# Patient Record
Sex: MALE | Race: WHITE | NOT HISPANIC OR LATINO | Employment: FULL TIME | URBAN - METROPOLITAN AREA
[De-identification: names, ages, dates, MRNs, and addresses within clinical notes are randomized per-mention and may not be internally consistent; named-entity substitution may affect disease eponyms.]

---

## 2018-04-05 ENCOUNTER — OFFICE VISIT (OUTPATIENT)
Dept: URGENT CARE | Facility: CLINIC | Age: 38
End: 2018-04-05
Payer: COMMERCIAL

## 2018-04-05 VITALS
SYSTOLIC BLOOD PRESSURE: 132 MMHG | WEIGHT: 240 LBS | OXYGEN SATURATION: 98 % | TEMPERATURE: 98 F | RESPIRATION RATE: 16 BRPM | DIASTOLIC BLOOD PRESSURE: 86 MMHG | HEIGHT: 74 IN | BODY MASS INDEX: 30.8 KG/M2 | HEART RATE: 62 BPM

## 2018-04-05 DIAGNOSIS — S62.357A CLOSED NONDISPLACED FRACTURE OF SHAFT OF FIFTH METACARPAL BONE OF LEFT HAND, INITIAL ENCOUNTER: Primary | ICD-10-CM

## 2018-04-05 DIAGNOSIS — S69.92XA INJURY OF LEFT HAND, INITIAL ENCOUNTER: ICD-10-CM

## 2018-04-05 PROCEDURE — 99203 OFFICE O/P NEW LOW 30 MIN: CPT | Mod: SA,S$GLB,, | Performed by: NURSE PRACTITIONER

## 2018-04-05 RX ORDER — TRAMADOL HYDROCHLORIDE 50 MG/1
50 TABLET ORAL EVERY 6 HOURS PRN
Qty: 10 TABLET | Refills: 0 | Status: SHIPPED | OUTPATIENT
Start: 2018-04-05 | End: 2018-08-06

## 2018-04-05 RX ORDER — IBUPROFEN 800 MG/1
800 TABLET ORAL 3 TIMES DAILY
Qty: 21 TABLET | Refills: 0 | Status: SHIPPED | OUTPATIENT
Start: 2018-04-05 | End: 2018-04-12

## 2018-04-05 NOTE — PROGRESS NOTES
"Subjective:       Patient ID: Jason Madden is a 37 y.o. male.    Vitals:  height is 6' 2" (1.88 m) and weight is 108.9 kg (240 lb). His oral temperature is 97.7 °F (36.5 °C). His blood pressure is 132/86 and his pulse is 62. His respiration is 16 and oxygen saturation is 98%.     Chief Complaint: Hand Pain (Left)    Pt presents with left hand pain. 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 He has iced it last night and today. He has been taking advil.   NV intact. + 2 radial pulse. + bony tenderness, swelling and bruising.       Hand Pain    The incident occurred 12 to 24 hours ago. The injury mechanism was a direct blow. The pain is present in the left fingers and left hand. The quality of the pain is described as cramping. The pain is at a severity of 5/10. The pain is mild. Pertinent negatives include no chest pain. He has tried NSAIDs for the symptoms.     Review of Systems   Constitution: Negative for chills and fever.   HENT: Negative for sore throat.    Eyes: Negative for blurred vision.   Cardiovascular: Negative for chest pain.   Respiratory: Negative for shortness of breath.    Skin: Negative for rash.   Musculoskeletal: Positive for stiffness. Negative for back pain and joint pain.        A little bit of numbness   Bruising   Swelling    Gastrointestinal: Negative for abdominal pain, diarrhea, nausea and vomiting.   Neurological: Negative for headaches.   Psychiatric/Behavioral: The patient is not nervous/anxious.        Objective:      Physical Exam   Constitutional: He is oriented to person, place, and time. He appears well-developed and well-nourished. No distress.   HENT:   Head: Normocephalic and atraumatic.   Right Ear: External ear normal.   Left Ear: External ear normal.   Nose: Nose normal.   Mouth/Throat: Oropharynx is clear and moist.   Eyes: Conjunctivae and EOM are " normal. Pupils are equal, round, and reactive to light.   Neck: Normal range of motion. Neck supple.   Cardiovascular: Intact distal pulses.    Pulses:       Radial pulses are 2+ on the right side, and 2+ on the left side.   Musculoskeletal:        Left hand: He exhibits decreased range of motion, tenderness, bony tenderness and swelling. He exhibits normal capillary refill, no deformity and no laceration.        Hands:  Neurological: He is alert and oriented to person, place, and time.   Skin: Skin is warm and dry. Capillary refill takes less than 2 seconds. Bruising noted. No abrasion, no laceration, no lesion and no rash noted. He is not diaphoretic. No erythema.   Psychiatric: He has a normal mood and affect. His behavior is normal. Judgment and thought content normal.                     Reading Physician Reading Date Result Priority   Santos Jean-Baptiste MD 4/5/2018    Narrative     EXAMINATION:  XR HAND COMPLETE 3 VIEW LEFT    CLINICAL HISTORY:  . Unspecified injury of left wrist, hand and finger(s), initial encounter    TECHNIQUE:  PA, lateral, and oblique views of the left hand were performed.    COMPARISON:  None    FINDINGS:  Acute nondisplaced fracture of the 5th metacarpal shaft.  Mild adjacent soft tissue thickening.  Remaining osseous structures are intact.  Joint spaces are satisfactorily maintained.  No radiopaque foreign body.      Impression       Acute nondisplaced fracture of the 5th metacarpal shaft.      Electronically signed by: Santos Jean-Baptiste MD  Date: 04/05/2018  Time: 17:15         Assessment:       1. Closed nondisplaced fracture of shaft of fifth metacarpal bone of left hand, initial encounter    2. Injury of left hand, initial encounter        Plan:         Closed nondisplaced fracture of shaft of fifth metacarpal bone of left hand, initial encounter  -     Cancel: Ambulatory referral to Orthopedics  -     SPLINT FOR HOME USE  -     Ambulatory referral to Orthopedics  -     ibuprofen  (ADVIL,MOTRIN) 800 MG tablet; Take 1 tablet (800 mg total) by mouth 3 (three) times daily.  Dispense: 21 tablet; Refill: 0  -     SLING FOR HOME USE  -     traMADol (ULTRAM) 50 mg tablet; Take 1 tablet (50 mg total) by mouth every 6 (six) hours as needed for Pain.  Dispense: 10 tablet; Refill: 0    Injury of left hand, initial encounter  -     X-Ray Hand Complete Left; Future; Expected date: 04/05/2018  -     Ambulatory referral to Orthopedics      Left message for [facility]  line to call patient for ortho referral- hand specialist.   Velcro splint placed in clinic (ulnar gutter splint). NV intact. + 2 pulse. Sling to help with elevation. Discussed splint care.   RICE.   Ultram for severe pain.     Patient Instructions   Take Ibuprofen with food.   Referral placed for ortho follow up. Need to follow up with hand specialist. Call [phone] to schedule an appointment.   Keep splint in place until you are seen by ortho.   Sling to help with elevation.   Rest, ice, elevate.       Please return here or go to the Emergency Department for any concerns or worsening of condition.  If you were prescribed antibiotics, please take them to completion.  If you were prescribed a narcotic medication, do not drive or operate heavy equipment or machinery while taking these medications.  Please follow up with your primary care doctor or specialist as needed.    If you  smoke, please stop smoking.      Closed Hand Fracture (Adult)  You have a fracture, or broken bone, in your hand. This may be a small crack or chip in the bone. Or it may be a major break with the broken parts pushed out of place. A closed fracture means that the broken bone has not gone through the skin. A hand fracture is treated with a splint or cast. It usually takes 4 to 6 weeks to heal. Severe injuries may require surgery.     Home care  · Keep your arm elevated to reduce pain and swelling. When sitting or lying down, elevate your arm above the level of  your heart. You can do this by placing your arm on a pillow that rests on your chest or on a pillow at your side. This is most important during the first 48 hours after injury.  · Apply an ice pack over the injured area for no more than 15 to 20 minutes. Do this every 1 to 2 hours for the first 24 to 48 hours. Continue with ice packs as needed to ease pain and swelling. To make an ice pack, put ice cubes in a plastic bag that seals at the top. Wrap the bag in a clean, thin towel or cloth. Never put ice or an ice pack directly on the skin. You can place the ice pack inside the sling and directly over the cast or splint. As the ice melts, be careful that the cast or splint doesnt get wet.  · Keep the cast or splint completely dry at all times. Bathe with your cast or splint out of the water, protected with 2 large plastic bags. Place 1 bag outside the other. Tape each bag with duct tape at the top end. If a fiberglass cast or splint gets wet, dry it with a hair dryer on a cool setting.  · You may use over-the-counter pain medicine to control pain, unless another pain medicine was prescribed. If you have chronic liver or kidney disease or ever had a stomach ulcer or GI bleeding, talk with your provider beforeusing these medicines.  Follow-up care  Follow up with your healthcare provider within 1 week, or as advised. This is to be sure the bone is healing properly. If you were given a splint, it may be changed to a cast at your follow-up visit.  If X-rays were taken, you will be told of any new findings that may affect your care.  When to seek medical advice  Call your healthcare provider right away if any of these occur:  · The plaster cast or splint becomes wet or soft  · The fiberglass cast or splint stays wet for more than 24 hours  · The cast has a bad smell  · The plaster cast or splint becomes loose  · There is increased tightness or pain under the cast or splint  · The fingers on your injured hand become  swollen, cold, blue, numb, or tingly  Date Last Reviewed: 12/3/2015  © 9600-5052 [employer] [address] [place] [address], [place] [address]. All rights reserved. This information is not intended as a substitute for professional medical care. Always follow your healthcare professional's instructions.

## 2018-04-05 NOTE — PATIENT INSTRUCTIONS
Take Ibuprofen with food.   Referral placed for ortho follow up. Need to follow up with hand specialist. Call [phone] to schedule an appointment.   Keep splint in place until you are seen by ortho.   Sling to help with elevation.   Rest, ice, elevate.       Please return here or go to the Emergency Department for any concerns or worsening of condition.  If you were prescribed antibiotics, please take them to completion.  If you were prescribed a narcotic medication, do not drive or operate heavy equipment or machinery while taking these medications.  Please follow up with your primary care doctor or specialist as needed.    If you  smoke, please stop smoking.      Closed Hand Fracture (Adult)  You have a fracture, or broken bone, in your hand. This may be a small crack or chip in the bone. Or it may be a major break with the broken parts pushed out of place. A closed fracture means that the broken bone has not gone through the skin. A hand fracture is treated with a splint or cast. It usually takes 4 to 6 weeks to heal. Severe injuries may require surgery.     Home care  · Keep your arm elevated to reduce pain and swelling. When sitting or lying down, elevate your arm above the level of your heart. You can do this by placing your arm on a pillow that rests on your chest or on a pillow at your side. This is most important during the first 48 hours after injury.  · Apply an ice pack over the injured area for no more than 15 to 20 minutes. Do this every 1 to 2 hours for the first 24 to 48 hours. Continue with ice packs as needed to ease pain and swelling. To make an ice pack, put ice cubes in a plastic bag that seals at the top. Wrap the bag in a clean, thin towel or cloth. Never put ice or an ice pack directly on the skin. You can place the ice pack inside the sling and directly over the cast or splint. As the ice melts, be careful that the cast or splint doesnt get wet.  · Keep the cast or splint completely dry  at all times. Bathe with your cast or splint out of the water, protected with 2 large plastic bags. Place 1 bag outside the other. Tape each bag with duct tape at the top end. If a fiberglass cast or splint gets wet, dry it with a hair dryer on a cool setting.  · You may use over-the-counter pain medicine to control pain, unless another pain medicine was prescribed. If you have chronic liver or kidney disease or ever had a stomach ulcer or GI bleeding, talk with your provider beforeusing these medicines.  Follow-up care  Follow up with your healthcare provider within 1 week, or as advised. This is to be sure the bone is healing properly. If you were given a splint, it may be changed to a cast at your follow-up visit.  If X-rays were taken, you will be told of any new findings that may affect your care.  When to seek medical advice  Call your healthcare provider right away if any of these occur:  · The plaster cast or splint becomes wet or soft  · The fiberglass cast or splint stays wet for more than 24 hours  · The cast has a bad smell  · The plaster cast or splint becomes loose  · There is increased tightness or pain under the cast or splint  · The fingers on your injured hand become swollen, cold, blue, numb, or tingly  Date Last Reviewed: 12/3/2015  © 5217-0209 [employer]. [address] [place] [address]. All rights reserved. This information is not intended as a substitute for professional medical care. Always follow your healthcare professional's instructions.

## 2018-04-09 ENCOUNTER — OFFICE VISIT (OUTPATIENT)
Dept: ORTHOPEDICS | Facility: CLINIC | Age: 38
End: 2018-04-09
Payer: COMMERCIAL

## 2018-04-09 VITALS
SYSTOLIC BLOOD PRESSURE: 133 MMHG | DIASTOLIC BLOOD PRESSURE: 95 MMHG | WEIGHT: 240 LBS | HEIGHT: 74 IN | BODY MASS INDEX: 30.8 KG/M2 | HEART RATE: 65 BPM | RESPIRATION RATE: 18 BRPM

## 2018-04-09 DIAGNOSIS — S62.327A CLOSED DISPLACED FRACTURE OF SHAFT OF FIFTH METACARPAL BONE OF LEFT HAND, INITIAL ENCOUNTER: Primary | ICD-10-CM

## 2018-04-09 PROCEDURE — 99999 PR PBB SHADOW E&M-EST. PATIENT-LVL III: CPT | Mod: PBBFAC,,, | Performed by: PHYSICIAN ASSISTANT

## 2018-04-09 PROCEDURE — 99204 OFFICE O/P NEW MOD 45 MIN: CPT | Mod: 25,SA,S$GLB, | Performed by: PHYSICIAN ASSISTANT

## 2018-04-09 PROCEDURE — 29125 APPL SHORT ARM SPLINT STATIC: CPT | Mod: LT,S$GLB,, | Performed by: PHYSICIAN ASSISTANT

## 2018-04-09 NOTE — PROGRESS NOTES
"Subjective:      Patient ID: Jason Madden is a 37 y.o. male.    Chief Complaint: Pain of the Left Hand      HPI  Jason Madden is a 37 y.o. male presenting today for urgent care follow up of left fifth metacarpal fracture. Injury occurred 4/4/18 during a soccer game where he was goalie. He was seen in urgent care the following day, xrays revealed metacarpal fracture. He has been in a TKO brace. Some intermittent pain, taking OTC analgesics during day, Ultram nightly. Denies numbness or tingling.           Review of patient's allergies indicates:   Allergen Reactions    Bactrim [sulfamethoxazole-trimethoprim] Hives         Current Outpatient Prescriptions   Medication Sig Dispense Refill    ibuprofen (ADVIL,MOTRIN) 800 MG tablet Take 1 tablet (800 mg total) by mouth 3 (three) times daily. 21 tablet 0    traMADol (ULTRAM) 50 mg tablet Take 1 tablet (50 mg total) by mouth every 6 (six) hours as needed for Pain. 10 tablet 0     No current facility-administered medications for this visit.        History reviewed. No pertinent past medical history.    History reviewed. No pertinent surgical history.    Review of Systems:  Constitutional: Negative for chills and fever.   Respiratory: Negative for cough and shortness of breath.    Gastrointestinal: Negative for nausea and vomiting.   Skin: Negative for rash.   Neurological: Negative for dizziness and headaches.   Psychiatric/Behavioral: Negative for depression.   MSK as in HPI       OBJECTIVE:     PHYSICAL EXAM:  BP (!) 133/95 (BP Location: Left arm, Patient Position: Sitting, BP Method: Large (Automatic))   Pulse 65   Resp 18   Ht 6' 2" (1.88 m)   Wt 108.9 kg (240 lb)   BMI 30.81 kg/m²     GEN:  NAD, well-developed, well-groomed.  NEURO: Awake, alert, and oriented. Normal attention and concentration.    PSYCH: Normal mood and affect. Behavior is normal.  HEENT: No cervical lymphadenopathy noted.  CARDIOVASCULAR: Radial pulses 2+ bilaterally. No LE edema " noted.  PULMONARY: Breath sounds normal. No respiratory distress.  SKIN: Intact, no rashes.      MSK:   LUE:  Good active ROM of the wrist and fingers, decreased flexion small finger due to pain. There does not appear to be any rotation of the finger. AIN/PIN/Radial/Median/Ulnar Nerves assessed in isolation without deficit. Radial & Ulnar arteries palpated 2+. Capillary Refill <3s.      RADIOGRAPHS:  Xray left hand 4/5/18  Impression   Acute nondisplaced fracture of the 5th metacarpal shaft.     Comments: I have personally reviewed the imaging and I agree with the above radiologist's report.    ASSESSMENT/PLAN:       ICD-10-CM ICD-9-CM   1. Closed displaced fracture of shaft of fifth metacarpal bone of left hand, initial encounter S62.327A 815.03       Orders Placed This Encounter    X-Ray Hand Complete Left        Plan:   -placed in ulnar gutter plaster splint   -RTC 1 wk with xrays, will check finger ROM         The patient indicates understanding of these issues and agrees to the plan.    Roseanna Ho PA-C  Hand Clinic   [facility] [place]

## 2018-04-16 ENCOUNTER — DOCUMENTATION ONLY (OUTPATIENT)
Dept: ORTHOPEDICS | Facility: CLINIC | Age: 38
End: 2018-04-16

## 2018-04-16 ENCOUNTER — HOSPITAL ENCOUNTER (OUTPATIENT)
Dept: RADIOLOGY | Facility: OTHER | Age: 38
Discharge: HOME | End: 2018-04-16
Attending: PHYSICIAN ASSISTANT
Payer: COMMERCIAL

## 2018-04-16 ENCOUNTER — OFFICE VISIT (OUTPATIENT)
Dept: ORTHOPEDICS | Facility: CLINIC | Age: 38
End: 2018-04-16
Payer: COMMERCIAL

## 2018-04-16 VITALS
HEIGHT: 74 IN | WEIGHT: 240 LBS | SYSTOLIC BLOOD PRESSURE: 135 MMHG | DIASTOLIC BLOOD PRESSURE: 88 MMHG | HEART RATE: 68 BPM | BODY MASS INDEX: 30.8 KG/M2

## 2018-04-16 DIAGNOSIS — S62.327A CLOSED DISPLACED FRACTURE OF SHAFT OF FIFTH METACARPAL BONE OF LEFT HAND, INITIAL ENCOUNTER: ICD-10-CM

## 2018-04-16 DIAGNOSIS — S62.327A CLOSED DISPLACED FRACTURE OF SHAFT OF FIFTH METACARPAL BONE OF LEFT HAND, INITIAL ENCOUNTER: Primary | ICD-10-CM

## 2018-04-16 PROCEDURE — 73130 X-RAY EXAM OF HAND: CPT | Mod: TC,FY,LT

## 2018-04-16 PROCEDURE — 73130 X-RAY EXAM OF HAND: CPT | Mod: 26,LT,, | Performed by: RADIOLOGY

## 2018-04-16 PROCEDURE — 99999 PR PBB SHADOW E&M-EST. PATIENT-LVL III: CPT | Mod: PBBFAC,,, | Performed by: PHYSICIAN ASSISTANT

## 2018-04-16 PROCEDURE — 99214 OFFICE O/P EST MOD 30 MIN: CPT | Mod: SA,S$GLB,, | Performed by: PHYSICIAN ASSISTANT

## 2018-04-16 RX ORDER — IBUPROFEN 800 MG/1
800 TABLET ORAL 3 TIMES DAILY
COMMUNITY
End: 2018-11-02

## 2018-04-16 NOTE — LETTER
April 16, 2018      Nory Haynes, NP  [address]  [facility] [address]           [facility]  [address], [address]  [facility] [address]  Phone: [phone]          Patient: Jason Madden   MR Number: [id number]   YOB: 1980   Date of Visit: 4/16/2018       Dear Nory Haynes:    Thank you for referring Jason Madden to me for evaluation. Attached you will find relevant portions of my assessment and plan of care.    If you have questions, please do not hesitate to call me. I look forward to following Jason Madden along with you.    Sincerely,    Roseanna Ho PA-C    Enclosure  CC:  No Recipients    If you would like to receive this communication electronically, please contact [email or web address] or [phone] to request more information on [employer] [employer] access.    For providers and/or their staff who would like to refer a patient to [facility], please contact us through our one-stop-shop provider referral line, [facility], at [phone].    If you feel you have received this communication in error or would no longer like to receive these types of communications, please e-mail [email or web address]

## 2018-04-16 NOTE — PROGRESS NOTES
"Subjective:      Patient ID: Jason Madden is a 37 y.o. male.    Chief Complaint: Pain and Injury of the Left Hand      HPI  Jason Madden is a 37 y.o. male presenting today for urgent care follow up of left fifth metacarpal fracture. Injury occurred 4/4/18 during a soccer game where he was goalie. He was seen in urgent care the following day, xrays revealed metacarpal fracture. He has been in a TKO brace. Some intermittent pain, taking OTC analgesics during day, Ultram nightly. Denies numbness or tingling.     Interval hx 4/16/18  Jason Madden presents for follow up left fifth metacarpal fracture. He has been in a splint. Doing well. Occasional Ultram at night.      Review of patient's allergies indicates:   Allergen Reactions    Bactrim [sulfamethoxazole-trimethoprim] Hives         Current Outpatient Prescriptions   Medication Sig Dispense Refill    ibuprofen (ADVIL,MOTRIN) 800 MG tablet Take 800 mg by mouth 3 (three) times daily.      traMADol (ULTRAM) 50 mg tablet Take 1 tablet (50 mg total) by mouth every 6 (six) hours as needed for Pain. 10 tablet 0     No current facility-administered medications for this visit.        History reviewed. No pertinent past medical history.    History reviewed. No pertinent surgical history.    Review of Systems:  Constitutional: Negative for chills and fever.   Respiratory: Negative for cough and shortness of breath.    Gastrointestinal: Negative for nausea and vomiting.   Skin: Negative for rash.   Neurological: Negative for dizziness and headaches.   Psychiatric/Behavioral: Negative for depression.   MSK as in HPI       OBJECTIVE:     PHYSICAL EXAM:  /88   Pulse 68   Ht 6' 2" (1.88 m)   Wt 108.9 kg (240 lb)   BMI 30.81 kg/m²     GEN:  NAD, well-developed, well-groomed.  NEURO: Awake, alert, and oriented. Normal attention and concentration.    PSYCH: Normal mood and affect. Behavior is normal.  HEENT: No cervical lymphadenopathy noted.  CARDIOVASCULAR: Radial " pulses 2+ bilaterally. No LE edema noted.  PULMONARY: Breath sounds normal. No respiratory distress.  SKIN: Intact, no rashes.      MSK:   LUE:  Good active ROM of the wrist and fingers, decreased flexion small finger due to pain. There does not appear to be any rotation of the finger. AIN/PIN/Radial/Median/Ulnar Nerves assessed in isolation without deficit. Radial & Ulnar arteries palpated 2+. Capillary Refill <3s.      RADIOGRAPHS:  Xray left hand 4/16/18  Alignment is stable.  Comments: I have personally reviewed the imaging and I agree with the above radiologist's report.    ASSESSMENT/PLAN:       ICD-10-CM ICD-9-CM   1. Closed displaced fracture of shaft of fifth metacarpal bone of left hand, initial encounter S62.327A 815.03        Plan:   -placed in ulnar gutter cast  -RTC 4 wks         The patient indicates understanding of these issues and agrees to the plan.    Roseanna Ho PA-C  Hand Clinic   [facility] [place]

## 2018-05-14 ENCOUNTER — OFFICE VISIT (OUTPATIENT)
Dept: ORTHOPEDICS | Facility: CLINIC | Age: 38
End: 2018-05-14
Payer: COMMERCIAL

## 2018-05-14 ENCOUNTER — HOSPITAL ENCOUNTER (OUTPATIENT)
Dept: RADIOLOGY | Facility: OTHER | Age: 38
Discharge: HOME | End: 2018-05-14
Attending: PHYSICIAN ASSISTANT
Payer: COMMERCIAL

## 2018-05-14 VITALS
HEIGHT: 74 IN | SYSTOLIC BLOOD PRESSURE: 148 MMHG | RESPIRATION RATE: 18 BRPM | DIASTOLIC BLOOD PRESSURE: 91 MMHG | BODY MASS INDEX: 30.8 KG/M2 | WEIGHT: 240 LBS | HEART RATE: 67 BPM

## 2018-05-14 DIAGNOSIS — M79.645 PAIN OF FINGER OF LEFT HAND: ICD-10-CM

## 2018-05-14 DIAGNOSIS — S62.327A CLOSED DISPLACED FRACTURE OF SHAFT OF FIFTH METACARPAL BONE OF LEFT HAND, INITIAL ENCOUNTER: ICD-10-CM

## 2018-05-14 DIAGNOSIS — S62.327A CLOSED DISPLACED FRACTURE OF SHAFT OF FIFTH METACARPAL BONE OF LEFT HAND, INITIAL ENCOUNTER: Primary | ICD-10-CM

## 2018-05-14 PROCEDURE — 73140 X-RAY EXAM OF FINGER(S): CPT | Mod: 26,LT,, | Performed by: RADIOLOGY

## 2018-05-14 PROCEDURE — 99999 PR PBB SHADOW E&M-EST. PATIENT-LVL III: CPT | Mod: PBBFAC,,, | Performed by: PHYSICIAN ASSISTANT

## 2018-05-14 PROCEDURE — 3008F BODY MASS INDEX DOCD: CPT | Mod: CPTII,S$GLB,, | Performed by: PHYSICIAN ASSISTANT

## 2018-05-14 PROCEDURE — 73130 X-RAY EXAM OF HAND: CPT | Mod: 26,LT,, | Performed by: RADIOLOGY

## 2018-05-14 PROCEDURE — 73140 X-RAY EXAM OF FINGER(S): CPT | Mod: 59,TC,FY,LT

## 2018-05-14 PROCEDURE — 73130 X-RAY EXAM OF HAND: CPT | Mod: TC,FY,LT

## 2018-05-14 PROCEDURE — 99214 OFFICE O/P EST MOD 30 MIN: CPT | Mod: SA,S$GLB,, | Performed by: PHYSICIAN ASSISTANT

## 2018-05-14 NOTE — PROGRESS NOTES
Subjective:      Patient ID: Jason Madden is a 37 y.o. male.    Chief Complaint: Pain of the Left Hand      HPI  Jason Madden is a 37 y.o. male presenting today for urgent care follow up of left fifth metacarpal fracture. Injury occurred 4/4/18 during a soccer game where he was cuongie. He was seen in urgent care the following day, xrays revealed metacarpal fracture. He has been in a TKO brace. Some intermittent pain, taking OTC analgesics during day, Ultram nightly. Denies numbness or tingling.     Interval hx 4/16/18  Jason Madden presents for follow up left fifth metacarpal fracture. He has been in a splint. Doing well. Occasional Ultram at night.    Interval 5/14/18:  Pt presents for follow up left fifth metacarpal fracture sustained 4/4/18. This is being treated non-operatively. He was initially splinted, then transitioned to a cast 4 weeks ago. Doing well. Denies pain. He notes stiffness of the left middle finger and decreased extension at the PIP. Not taking anything for pain.      Review of patient's allergies indicates:   Allergen Reactions    Bactrim [sulfamethoxazole-trimethoprim] Hives         Current Outpatient Prescriptions   Medication Sig Dispense Refill    ibuprofen (ADVIL,MOTRIN) 800 MG tablet Take 800 mg by mouth 3 (three) times daily.      traMADol (ULTRAM) 50 mg tablet Take 1 tablet (50 mg total) by mouth every 6 (six) hours as needed for Pain. 10 tablet 0     No current facility-administered medications for this visit.        History reviewed. No pertinent past medical history.    History reviewed. No pertinent surgical history.    Review of Systems:  Constitutional: Negative for chills and fever.   Respiratory: Negative for cough and shortness of breath.    Gastrointestinal: Negative for nausea and vomiting.   Skin: Negative for rash.   Neurological: Negative for dizziness and headaches.   Psychiatric/Behavioral: Negative for depression.   MSK as in HPI       OBJECTIVE:     PHYSICAL  "EXAM:  BP (!) 148/91 (BP Location: Left arm, Patient Position: Sitting, BP Method: Large (Automatic))   Pulse 67   Resp 18   Ht 6' 2" (1.88 m)   Wt 108.9 kg (240 lb)   BMI 30.81 kg/m²     GEN:  NAD, well-developed, well-groomed.  NEURO: Awake, alert, and oriented. Normal attention and concentration.    PSYCH: Normal mood and affect. Behavior is normal.  HEENT: No cervical lymphadenopathy noted.  CARDIOVASCULAR: Radial pulses 2+ bilaterally. No LE edema noted.  PULMONARY: Breath sounds normal. No respiratory distress.  SKIN: Intact, no rashes.      MSK:   LUE:  Cast removed. Good wrist motion, decreased flexion of fingers secondary to stiffness. He does have flexion contracture at the long PIP without pain to palpation.  AIN/PIN/Radial/Median/Ulnar Nerves assessed in isolation without deficit. Radial & Ulnar arteries palpated 2+. Capillary Refill <3s.      RADIOGRAPHS:  Xray left hand 5/14/18  Impression   Healing fracture of the left 5th metacarpal bone with no detrimental change noted when compared to 04/16/2018.     Comments: I have personally reviewed the imaging and I agree with the above radiologist's report.    ASSESSMENT/PLAN:       ICD-10-CM ICD-9-CM   1. Closed displaced fracture of shaft of fifth metacarpal bone of left hand, initial encounter S62.327A 815.03   2. Pain of finger of left hand M79.645 729.5     Orders Placed This Encounter   Procedures    X-Ray Finger 2 View or More Views    X-Ray Hand Complete Left    Ambulatory Referral to Physical/Occupational Therapy          Plan:   -placed in orthoglass today, he has a TKO brace at home he will transition to  -xray left long finger today   -OT ordered   -RTC 6 wks with xray        The patient indicates understanding of these issues and agrees to the plan.    Roseanna Ho PA-C  Hand Clinic   [facility] [place]    "

## 2018-05-23 ENCOUNTER — CLINICAL SUPPORT (OUTPATIENT)
Dept: REHABILITATION | Facility: HOSPITAL | Age: 38
End: 2018-05-23
Payer: COMMERCIAL

## 2018-05-23 DIAGNOSIS — S62.367D CLOSED NONDISPLACED FRACTURE OF NECK OF FIFTH METACARPAL BONE OF LEFT HAND WITH ROUTINE HEALING: ICD-10-CM

## 2018-05-23 DIAGNOSIS — M79.642 HAND PAIN, LEFT: ICD-10-CM

## 2018-05-23 DIAGNOSIS — M25.60 RANGE OF MOTION DEFICIT: ICD-10-CM

## 2018-05-23 DIAGNOSIS — R29.898 DECREASED GRIP STRENGTH OF LEFT HAND: ICD-10-CM

## 2018-05-23 PROCEDURE — 97018 PARAFFIN BATH THERAPY: CPT | Mod: 59

## 2018-05-23 PROCEDURE — 97165 OT EVAL LOW COMPLEX 30 MIN: CPT

## 2018-05-23 NOTE — PATIENT INSTRUCTIONS
"[facility]  OCCUPATIONAL THERAPY  HOME EXERCISE PROGRAM     Moist heat or hot, wet compress 5 minutes before  exercises.  Ice at night to relieve joint and tendon pain/swelling.  Non-weight bearing with left hand and no heavy lifting.    Complete the following exercises with 10 repetitions each, 4-6x/day.     AROM: DIP Flexion / Extension  Pinch middle knuckle to prevent bending.   Bend end knuckle until stretch is felt. Hold 3   seconds. Relax. Straighten finger as far as possible.      AROM: PIP Flexion / Extension  Pinch bottom knuckle  to prevent bending.   Actively bend middle knuckle until stretch is felt.   Hold 3 seconds. Relax. Straighten finger as far as possible.    Reverse blocking (hold behind middle joint and try to straighten the finger).      AROM: Isolated PIP Flexion  Bend only middle joint of your finger,   keeping other fingers straight with other hand.      AROM: Isolated MCP Flexion / Extension ("Wave")   Bend only your large, bottom knuckles. Hold 3 seconds.   Keep the tips of your fingers straight. Straighten fingers.      AROM: Isolated IPJ Flexion / Extension ("Hook")  Bend only your middle and end knuckles. Hold 3 seconds.   Straighten your fingers.       AROM: MCP and PIP Flexion / Extension ("Straight Fist")  Bend your bottom and middle knuckles, keeping the tips of your  fingers straight. Try to touch the pads of your fingers on your palm.   Hold 3 seconds. Straighten your fingers.       AROM: Composite Flexion / Extension ("Full Fist")  Bend every joint in your hand into a fist. Hold 3 seconds.   Straighten your fingers.       AROM: Composte Extension ("Finger Lifts")  Lift your finger off of the table one at a time. Hold 3 seconds.   Relax your finger.      AROM: Abduction / Adduction  With hand flat on table, spread all fingers apart,   then bring them together as close as possible.    Copyright © I. All rights reserved.     Therapist: ROULA Mojica CHT  "

## 2018-05-23 NOTE — PROGRESS NOTES
Occupational Therapy -Hand / Wrist  Evaluation    Patient: Jason Madden  Date of Evaluation: 2018  Referring Physician:  Dr. FLORENCIO Betancourt  Diagnosis: 5th metacarpal fracture, non op 4/15/18   1. Closed nondisplaced fracture of neck of fifth metacarpal bone of left hand with routine healing     2. Range of motion deficit     3. Hand pain, left     4. 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[employer], rolls tables)  Working presently:  yes  Workmen's Compensation:  no    Date of onset: 2018  Involved area:  Left hand  Mechanism of Injury:   Sports injury soccer  Past Medical History/Physical Systems Review: unremarkable    Living status: with family     Environmental Concerns/ Fall Risk:  None  Barriers to Learning: None   Cultural/Spiritual : None   Developmental/Education: None   Abuse/ Neglect: none   Nutritional Deficit: None   Language: None   Hearing/Vision Deficit: None   Other:     Subjective:  Pt reports Pain is minimal, but it's stiff and inflexible.    Pain   At rest: 1 or 2 out of 10  With work/ Activity: 2 out of 10  Sleepin out of 10  Location of Pain : stiffness in middle, ring, small fingers, dorsum of hane     Patients goals for therapy are:  to be able to do a push-up and use it during exercise    Objective:   Observation  :Swelling, generalized stiffness of PIP joints of middle, ring, small     Sensation: intact   Scar / Wound: none   Edema:    MP'S 21.9 CM   PPC 24.7 CM         Range of Motion:        ROM: Middle / Ring/ Small   MP   0/54/ ; 0/39; 0/31  PIP; 42/65 ; 42/67; 28/55  DIP  0/25 ; 0/27; 0/20  MOONEY:      102/ 91 / 78           FULL OPPOSITION                                            Manual Muscle Test:  FDP/ FDS 3-/5   EDC 3-/5   EDM 3-/5   ADM 3-/5   ODM 3-/5  FDM 3-/5    DAB/PAD 3/5                                          Strength: (LULU Dynamometer in lbs.), Average 3 trials, Position II    TBA    Pinch Strength: (Pinch Gauge in psis), Average 3 trials       3 PT PINCH NT     Functional Limitations:   Self Care / ADL: Limited use of LEFT  for ADLs, grooming, hygiene  Work/Activities: Limited use of left hand for typing  Leisure: Limited use of left hand for tennis, golf, and gym     Treatment Included:   OT evaluation and instruction in written HEP including  Blocking FDP, FDS, isolated FDS glide, reverse blocking PIP joint, intrinsic +/-, composite and flat fist, ab/ad, digit extension x `10 reps, 3-5 x daily.  Reviewed modality use for pain, stiffness, swelling and performed paraffin bath x 10 min to increase blood flow, circulation and tissue elasticity prior to therex.  Patient reported good understanding of HEP. Adjusted OTC brace to increase wrist extension, MP flex to 70 and IP's extended.  Patient instructed in no weightbearing at this time, and brace use for activity / protection as needed.      Patient demonstrates good understanding of HEP/modality use for pain management.    Assessment:   Problem List : left hand       Decreased ROM   Decreased  and pinch strength --TBA   Decreased muscle strength   Decreased functional hand use   Increased pain   Edema       Profile and History Assessment of Occupational Performance Level of Clinical Decision Making Complexity Score   Occupational Profile:   Jason Madden is a 37 y.o. male who lives with their family and is currently employed as [employer]. Jason Madden has difficulty with  grooming and dressing  driving/transportation management, phone/computer use, housework/household chores and lifting, carryinig, gripping, driving, playing golf.   affecting his/her daily functional abilities. His/her main goal for therapy is regain full hand use .     Comorbidities:   unremarkable     Medical and Therapy  History Review:   Brief               Performance Deficits    Physical:  Joint Mobility  Joint Stability  Muscle Power/Strength  Muscle Endurance  Edema   Strength  Pinch Strength  Gross Motor Coordination  Pain    Cognitive:  No Deficits    Psychosocial:    Habits  Routines     Clinical Decision Making:  low    Assessment Process:  Problem-Focused Assessments    Modification/Need for Assistance:  Not Necessary    Intervention Selection:  Limited Treatment Options       low  Based on PMHX, co morbidities , data from assessments and functional level of assistance required with task and clinical presentation directly impacting function.       Goals: ( 6 weeks)   1)  Patient to be IND with HEP and modalities for pain management  2)  Increase ROM 3-5 degrees to increase functional hand use for ADLs/work/leisure activities  3)  Decrease edema .2-.3 mm to increase joint mobility /flexibility for functional hand use.   4)  Assess  and 3 pt pinch and set goals accordingly     Plan:   Patient to be treated by Occupational Therapy    1-2    times per week for   6-8                   weeks  during the certification period from   5/23/2018     to  7/23/2018    to achieve the established goals.  Treatment to include :  paraffin, fluidotherapy, manual therapy/joint mobilizations,  modalities for pain management, US 3mhz, therapeutic exercises/activities,        strengthening,    scar and edema management, as well as any other treatments deemed necessary based on the patient's needs or progress.               I certify the need for these services furnished under this plan of treatment and while under my care    ____________________________________                         __________________  Physician/Referring Practitioner                                               Date of Signature

## 2018-06-01 ENCOUNTER — CLINICAL SUPPORT (OUTPATIENT)
Dept: REHABILITATION | Facility: HOSPITAL | Age: 38
End: 2018-06-01
Payer: COMMERCIAL

## 2018-06-01 DIAGNOSIS — S62.367D CLOSED NONDISPLACED FRACTURE OF NECK OF FIFTH METACARPAL BONE OF LEFT HAND WITH ROUTINE HEALING: ICD-10-CM

## 2018-06-01 DIAGNOSIS — R29.898 DECREASED GRIP STRENGTH OF LEFT HAND: ICD-10-CM

## 2018-06-01 DIAGNOSIS — M79.642 HAND PAIN, LEFT: ICD-10-CM

## 2018-06-01 DIAGNOSIS — M25.60 RANGE OF MOTION DEFICIT: ICD-10-CM

## 2018-06-01 PROCEDURE — 97140 MANUAL THERAPY 1/> REGIONS: CPT

## 2018-06-01 PROCEDURE — 97018 PARAFFIN BATH THERAPY: CPT

## 2018-06-01 PROCEDURE — 97110 THERAPEUTIC EXERCISES: CPT

## 2018-06-01 NOTE — PROGRESS NOTES
"OT Daily Progress Note    Patient:  Jason Madden  [facility] #:  [id number]   Date of Note: 06/01/2018   Referring Physician:  Roseanna Ho PA-C  Diagnosis:    Encounter Diagnoses   Name Primary?    Closed nondisplaced fracture of neck of fifth metacarpal bone of left hand with routine healing     Range of motion deficit     Hand pain, left     Decreased  strength of left hand         Visit 2 of 20, expires 12/31/18  FOTO     Start Time: 8:15  End Time: 9:00  Total Time: 45 min  Group Time: 0      Subjective:   "It's moving a little better every day"  Pt reports good compliance with HEP and brace wear.  Pain: 1-2 out of 10     Objective:   Patient seen by OT this session. Treatment  consist of the following: Performed paraffin bath x 10 min to increase blood flow, circulation and tissue elasticity prior to therex.  Pt received PROM to LF and RF digits with passive stretch and gentle PROM to SF x 10 reps each, all planes. MT: provided deep STM. Joint mobilizations to include: LF, RF, SF IPs, grade 1-2, all planes functional glide. Patient performed tendon glides for FDS/FDP, intrinsic +/-, composite and flat fisting x 10 reps each for ROM and joint mobility. Pt performed joint blocking to IPs, reverse joint blocking to PIPs, finger abd/add, digit ext x 10 reps each. Reviewed HEP, modalities, and digit stretches as tolerated. Pt verbalized understanding of education.       Treatment: Paraffin x 10 min, Therapeutic exercises x 20 min and Manual therapy x 10 min     Assessment:  Pt with good tolerance of tx this date. Improved AROM of digit noted but stiffness remains in PIPs, especially LF PIP ext.  Cont to be unable to make fist. Pt will continue to benefit from skilled OT intervention to improve ROM and functional use of L hand.   Patient is making good progress toward established goals.   Patient continues to demonstrate limitation  with  ROM, Joint mobility, Stiffness, Decreased functional use, Decreased " strength, Continued pain and Continued inflammation    Goals: ( 6 weeks)   1)  Patient to be IND with HEP and modalities for pain management  2)  Increase ROM 3-5 degrees to increase functional hand use for ADLs/work/leisure activities  3)  Decrease edema .2-.3 mm to increase joint mobility /flexibility for functional hand use.   4)  Assess  and 3 pt pinch and set goals accordingly     Plan:   Cont OT poc 1-2x/week for 8 weeks during certification period 5/23/18 to 7/23/18 in pursuit of established goals.    ROULA Oropeza

## 2018-06-08 ENCOUNTER — CLINICAL SUPPORT (OUTPATIENT)
Dept: REHABILITATION | Facility: HOSPITAL | Age: 38
End: 2018-06-08
Payer: COMMERCIAL

## 2018-06-08 DIAGNOSIS — M79.642 HAND PAIN, LEFT: ICD-10-CM

## 2018-06-08 DIAGNOSIS — R29.898 DECREASED GRIP STRENGTH OF LEFT HAND: ICD-10-CM

## 2018-06-08 DIAGNOSIS — S62.367D CLOSED NONDISPLACED FRACTURE OF NECK OF FIFTH METACARPAL BONE OF LEFT HAND WITH ROUTINE HEALING: ICD-10-CM

## 2018-06-08 DIAGNOSIS — M25.60 RANGE OF MOTION DEFICIT: ICD-10-CM

## 2018-06-08 PROCEDURE — 97140 MANUAL THERAPY 1/> REGIONS: CPT

## 2018-06-08 PROCEDURE — 97018 PARAFFIN BATH THERAPY: CPT | Mod: 59

## 2018-06-08 PROCEDURE — 97110 THERAPEUTIC EXERCISES: CPT

## 2018-06-08 NOTE — PROGRESS NOTES
"OT Daily Progress Note    Patient:  Jason Madden  [facility] #:  [id number]   Date of Note: 06/08/2018   Referring Physician:  Roseanna Ho PA-C  Diagnosis:    Encounter Diagnoses   Name Primary?    Closed nondisplaced fracture of neck of fifth metacarpal bone of left hand with routine healing     Range of motion deficit     Hand pain, left     Decreased  strength of left hand         Visit 3 of 20, expires 12/31/18  FOTO     Start Time: 8:30  End Time: 9:15  Total Time: 45 min  Group Time: 0      Subjective:   "I can tell it's getting better, but they're still stiff, especially the middle finger"  Pt reports good compliance with HEP and brace wear.  Pain: 1-2 out of 10     Objective:   Patient seen by OT this session. Treatment  consist of the following: Performed paraffin bath x 10 min to increase blood flow, circulation and tissue elasticity prior to therex.  Patient received ultrasound to L LF & RF PIPs area to increase blood flow, circulation, tissue elasticity, pain management and for wound/scar management for 8 minutes @ 3.3 Mhz, Intensity 0.5 w/cm2 at 100% duty cycle.   Pt received PROM to LF and RF digits with passive stretch and gentle PROM to SF x 10 reps each, all planes. MT: provided deep STM. Joint mobilizations to include: LF, RF, SF IPs, grade 1-2, all planes functional glide. Patient performed tendon glides for FDS/FDP, intrinsic +/-, composite and flat fisting x 10 reps each for ROM and joint mobility. Pt performed joint blocking to IPs, reverse joint blocking to PIPs, finger abd/add, digit ext x 10 reps each. Reviewed HEP, modalities, and digit stretches as tolerated. Pt verbalized understanding of education. Pt given LMB size D for increased stretch for PIP ext, with slight adjustments for tension. Pt verbalized understanding of jake/doffing of splint, and for wear schedule.       Treatment: Paraffin x 10 min, Ultrasound x 8 min, Therapeutic exercises x 15 min and Manual therapy x 12 min   "   Assessment:  Pt with good tolerance of tx this date. Some improvement of AROM of digits noted but stiffness remains in PIPs, especially LF PIP ext.  Cont to be unable to make full fist. Pt will continue to benefit from skilled OT intervention to improve ROM and functional use of L hand.   Patient is making good progress toward established goals.   Patient continues to demonstrate limitation  with  ROM, Joint mobility, Stiffness, Decreased functional use, Decreased strength, Continued pain and Continued inflammation    Goals: ( 6 weeks)   1)  Patient to be IND with HEP and modalities for pain management  2)  Increase ROM 3-5 degrees to increase functional hand use for ADLs/work/leisure activities  3)  Decrease edema .2-.3 mm to increase joint mobility /flexibility for functional hand use.   4)  Assess  and 3 pt pinch and set goals accordingly     Plan:   Cont OT poc 1-2x/week for 8 weeks during certification period 5/23/18 to 7/23/18 in pursuit of established goals.    ROULA Oropeza

## 2018-06-14 ENCOUNTER — CLINICAL SUPPORT (OUTPATIENT)
Dept: REHABILITATION | Facility: HOSPITAL | Age: 38
End: 2018-06-14
Payer: COMMERCIAL

## 2018-06-14 DIAGNOSIS — S62.367D CLOSED NONDISPLACED FRACTURE OF NECK OF FIFTH METACARPAL BONE OF LEFT HAND WITH ROUTINE HEALING: ICD-10-CM

## 2018-06-14 DIAGNOSIS — M25.60 RANGE OF MOTION DEFICIT: ICD-10-CM

## 2018-06-14 DIAGNOSIS — M79.642 HAND PAIN, LEFT: ICD-10-CM

## 2018-06-14 DIAGNOSIS — R29.898 DECREASED GRIP STRENGTH OF LEFT HAND: ICD-10-CM

## 2018-06-14 PROCEDURE — 97110 THERAPEUTIC EXERCISES: CPT

## 2018-06-14 PROCEDURE — 97035 APP MDLTY 1+ULTRASOUND EA 15: CPT

## 2018-06-14 PROCEDURE — 97140 MANUAL THERAPY 1/> REGIONS: CPT

## 2018-06-21 ENCOUNTER — CLINICAL SUPPORT (OUTPATIENT)
Dept: REHABILITATION | Facility: HOSPITAL | Age: 38
End: 2018-06-21
Payer: COMMERCIAL

## 2018-06-21 DIAGNOSIS — R29.898 DECREASED GRIP STRENGTH OF LEFT HAND: ICD-10-CM

## 2018-06-21 DIAGNOSIS — M79.642 HAND PAIN, LEFT: ICD-10-CM

## 2018-06-21 DIAGNOSIS — M25.60 RANGE OF MOTION DEFICIT: ICD-10-CM

## 2018-06-21 DIAGNOSIS — S62.367D CLOSED NONDISPLACED FRACTURE OF NECK OF FIFTH METACARPAL BONE OF LEFT HAND WITH ROUTINE HEALING: ICD-10-CM

## 2018-06-21 PROCEDURE — 97110 THERAPEUTIC EXERCISES: CPT

## 2018-06-21 PROCEDURE — 97140 MANUAL THERAPY 1/> REGIONS: CPT

## 2018-06-21 PROCEDURE — 97035 APP MDLTY 1+ULTRASOUND EA 15: CPT

## 2018-06-21 NOTE — PROGRESS NOTES
"OT Daily Progress Note    Patient:  Jason Maddne  [facility] #:  [id number]   Date of Note: 06/21/2018   Referring Physician:  Roseanna Ho PA-C  Diagnosis:    Encounter Diagnoses   Name Primary?    Closed nondisplaced fracture of neck of fifth metacarpal bone of left hand with routine healing     Range of motion deficit     Hand pain, left     Decreased  strength of left hand         Visit 4 of 20, expires 12/31/18  NO FOTO     Start Time: 8:15  End Time: 9:00  Total Time: 45 min  Group Time: 0      Subjective:   "The splint and putty have helped a lot. I had a milestone moment after I grabbed my saute pan."  Pain: 1-2 out of 10 (mostly around wrist)    MD 6/25/2018     Objective:   Patient seen by OT this session. Treatment  consist of the following: Performed paraffin bath x 10 min to increase blood flow, circulation and tissue elasticity prior to therex.  Patient received ultrasound to L LF & RF PIP collateral ligaments and volar plate  area to increase blood flow, circulation, tissue elasticity, pain management and for wound/scar management for 8 minutes @ 3.3 Mhz, Intensity 0.5 w/cm2 at 100% duty cycle.   Pt received mobilization and PROM to LF and RF digits with passive stretch and gentle PROM to SF x 10 reps each, all planes. Joint mobilizations to include: LF, RF, SF IPs, grade 1-2, all planes functional glide. Patient performed blocking and  tendon glides for FDS/FDP, reverse blocking PIP, intrinsic +/-, composite fist with and without ramila taping x 10 reps each for ROM and joint mobility.   Added red theraputty for gross sustained , raking, reverse raking, 3 pt pinch and isolated finger flexion and extension x 10 reps each for LF,RF, SF.  Reviewed HEP, modalities, and digit stretches as tolerated. Pt verbalized understanding of education. Reviewed use of LMB for ring/middle finger x 30 min each 3-5 x daily to increase stretch and flexibility for PIP ext.    Pt verbalized understanding of " jake/doffing of splint, and for wear schedule. Encouraged ramila taping for HEP or with gripping activities to promote muscle re-education and ROM.     RE-assessed as follows:     AROM extension/flexion:   Index Middle  Ring  Small  MP 0/70 0/73  0/68  0/63  PIP 0/100 35/92  40/84  36/70  DIP 0/57 0/43  0/35  0/36  MOONEY 227 173(+71) 147(+56) 133(+55)     Strength: L (dominant) 22 lbs    R 50 lbs    3-pt pinch: L 4.5 psi  R 9 psi      Treatment: Paraffin x 10 min, Ultrasound x 8 min, Therapeutic exercises x 15 min and Manual therapy x 12 min     Assessment:  Pt arrived without LMB but reports wearing 30 min for middle, and ring finger alternating 3x daily.   Pt with good tolerance of tx this date. Improvent in ROM noted, PIP extension lags remain but slightly improved.   and 3 pt pinch remain limited.  Will progress as tolerated.    Patient is making good progress toward established goals.   Patient continues to demonstrate limitation  with  ROM, Joint mobility, Stiffness, Decreased functional use, Decreased strength, Continued pain and Continued inflammation    Goals: ( 6 weeks)   1)  Patient to be IND with HEP and modalities for pain management  2)  Increase ROM 3-5 degrees to increase functional hand use for ADLs/work/leisure activities--met, continuing  3)  Decrease edema .2-.3 mm to increase joint mobility /flexibility for functional hand use.   4)  Assess  and 3 pt pinch and set goals accordingly --met     Updated   1) Increase ROM 3-5 degrees to increase  functional hand use for ADLs/work/leisure activities.  2) Increase  strength of L hand 5-10 lbs to be able to better grasp objects, such as a pan.  3) Increase pinch strength 1-3 psi to improve fine motor coordination for ADLs/work activities.     Plan:   Cont OT poc 1-2x/week for 8 weeks during certification period 5/23/18 to 7/23/18 in pursuit of established goals.

## 2018-06-22 NOTE — PROGRESS NOTES
"OT Daily Progress Note    Patient:  Jason Madden  [facility] #:  [id number]   Date of Note: 6/14/2018  Referring Physician:  Roseanna Ho PA-C  Diagnosis:    Encounter Diagnoses   Name Primary?    Closed nondisplaced fracture of neck of fifth metacarpal bone of left hand with routine healing     Range of motion deficit     Hand pain, left     Decreased  strength of left hand         Visit 4 of 20, expires 12/31/18  NO FOTO     Start Time: 8:30  End Time: 9:15  Total Time: 45 min  Group Time: 0      Subjective:   "I wear the brace about 30 min, I was wondering if I should use it on ring finger, the middle finger does usually look straighter when I take it off."   Pain: 1-2 out of 10     Objective:   Patient seen by OT this session. Treatment  consist of the following: Performed paraffin bath x 10 min to increase blood flow, circulation and tissue elasticity prior to therex.  Patient received ultrasound to L LF & RF PIPs area to increase blood flow, circulation, tissue elasticity, pain management and for wound/scar management for 8 minutes @ 3.3 Mhz, Intensity 0.5 w/cm2 at 100% duty cycle.   Pt received PROM to LF and RF digits with passive stretch and gentle PROM to SF x 10 reps each, all planes. MT: provided deep STM. Joint mobilizations to include: LF, RF, SF IPs, grade 1-2, all planes functional glide. Patient performed tendon glides for FDS/FDP, intrinsic +/-, composite and flat fisting x 10 reps each for ROM and joint mobility. Pt performed joint blocking to IPs, reverse joint blocking to PIPs, finger abd/add, digit ext x 10 reps each. Added ramila taping for intrinsic +/-, composite fist x 10 reps each, alternating IF-MF, MF-RF, RF-SF to promote normal movement pattern and AAROM followed by "place and hold" for composite fisting.  Added red theraputty for gross , raking, reverse raking,  isolated digit flexion, isolated digit extension x 10 reps, 1-2 x daily.  Encouraged LMB use for alternating ring and " "middle finger x 30 min each, 3-5 x daily as  Tolerated.  Reviewed HEP, modalities, and digit stretches as tolerated. Pt verbalized understanding of education.  Pt verbalized understanding of jake/doffing of splint, and for wear schedule.       Treatment: Paraffin x 10 min, Ultrasound x 8 min, Therapeutic exercises x 15 min and Manual therapy x 12 min     Assessment:  Pt with good tolerance of tx this date. Some improvement of AROM of digits noted but stiffness remains in PIPs, especially LF/RF  PIP ext.  Near flat fist obtained following ramila taping and "place and hold" in composite flexion.  Pt will continue to benefit from skilled OT intervention to improve ROM and functional use of L hand.   Patient is making good progress toward established goals.   Patient continues to demonstrate limitation  with  ROM, Joint mobility, Stiffness, Decreased functional use, Decreased strength, Continued pain and Continued inflammation    Goals: ( 6 weeks)   1)  Patient to be IND with HEP and modalities for pain management  2)  Increase ROM 3-5 degrees to increase functional hand use for ADLs/work/leisure activities  3)  Decrease edema .2-.3 mm to increase joint mobility /flexibility for functional hand use.   4)  Assess  and 3 pt pinch and set goals accordingly     Plan:   Cont OT poc 1-2x/week for 8 weeks during certification period 5/23/18 to 7/23/18 in pursuit of established goals.             "

## 2018-06-25 ENCOUNTER — OFFICE VISIT (OUTPATIENT)
Dept: ORTHOPEDICS | Facility: CLINIC | Age: 38
End: 2018-06-25
Payer: COMMERCIAL

## 2018-06-25 ENCOUNTER — HOSPITAL ENCOUNTER (OUTPATIENT)
Dept: RADIOLOGY | Facility: OTHER | Age: 38
Discharge: HOME | End: 2018-06-25
Attending: PHYSICIAN ASSISTANT
Payer: COMMERCIAL

## 2018-06-25 VITALS
HEART RATE: 64 BPM | SYSTOLIC BLOOD PRESSURE: 122 MMHG | HEIGHT: 74 IN | WEIGHT: 240 LBS | DIASTOLIC BLOOD PRESSURE: 78 MMHG | BODY MASS INDEX: 30.8 KG/M2

## 2018-06-25 DIAGNOSIS — S62.327A CLOSED DISPLACED FRACTURE OF SHAFT OF FIFTH METACARPAL BONE OF LEFT HAND, INITIAL ENCOUNTER: ICD-10-CM

## 2018-06-25 DIAGNOSIS — S62.327A CLOSED DISPLACED FRACTURE OF SHAFT OF FIFTH METACARPAL BONE OF LEFT HAND, INITIAL ENCOUNTER: Primary | ICD-10-CM

## 2018-06-25 PROCEDURE — 3008F BODY MASS INDEX DOCD: CPT | Mod: CPTII,S$GLB,, | Performed by: PHYSICIAN ASSISTANT

## 2018-06-25 PROCEDURE — 73130 X-RAY EXAM OF HAND: CPT | Mod: TC,FY,LT

## 2018-06-25 PROCEDURE — 99213 OFFICE O/P EST LOW 20 MIN: CPT | Mod: S$GLB,,, | Performed by: PHYSICIAN ASSISTANT

## 2018-06-25 PROCEDURE — 73130 X-RAY EXAM OF HAND: CPT | Mod: 26,LT,, | Performed by: RADIOLOGY

## 2018-06-25 PROCEDURE — 99999 PR PBB SHADOW E&M-EST. PATIENT-LVL III: CPT | Mod: PBBFAC,,, | Performed by: PHYSICIAN ASSISTANT

## 2018-06-25 NOTE — PROGRESS NOTES
"Subjective:      Patient ID: Jason Madden is a 37 y.o. male.    Chief Complaint: Pain of the Left Hand      6/25/18  Jason Madden presents for follow up left fifth metacarpal fracture sustained 4/4/18. He was last seen 5/14/18 and transitioned into a brace and OT initiated. He has weaned from the brace. He has been compliant with OT. He does still have stiffness in the fingers, especially the middle and ring. He is unable to make a composite fist- he states this is gradually improving with therapy and the bracing.     Review of patient's allergies indicates:   Allergen Reactions    Bactrim [sulfamethoxazole-trimethoprim] Hives         Current Outpatient Prescriptions   Medication Sig Dispense Refill    ibuprofen (ADVIL,MOTRIN) 800 MG tablet Take 800 mg by mouth 3 (three) times daily.      traMADol (ULTRAM) 50 mg tablet Take 1 tablet (50 mg total) by mouth every 6 (six) hours as needed for Pain. 10 tablet 0     No current facility-administered medications for this visit.        History reviewed. No pertinent past medical history.    History reviewed. No pertinent surgical history.    Review of Systems:  Constitutional: Negative for chills and fever.   Respiratory: Negative for cough and shortness of breath.    Gastrointestinal: Negative for nausea and vomiting.   Skin: Negative for rash.   Neurological: Negative for dizziness and headaches.   Psychiatric/Behavioral: Negative for depression.   MSK as in HPI       OBJECTIVE:     PHYSICAL EXAM:  /78 (BP Location: Left arm, Patient Position: Sitting, BP Method: Large (Automatic))   Pulse 64   Ht 6' 2" (1.88 m)   Wt 108.9 kg (240 lb)   BMI 30.81 kg/m²     GEN:  NAD, well-developed, well-groomed.  NEURO: Awake, alert, and oriented. Normal attention and concentration.    PSYCH: Normal mood and affect. Behavior is normal.  HEENT: No cervical lymphadenopathy noted.  CARDIOVASCULAR: Radial pulses 2+ bilaterally. No LE edema noted.  PULMONARY: Breath sounds " normal. No respiratory distress.  SKIN: Intact, no rashes.      MSK:   LUE:  Good wrist motion, decreased flexion of fingers secondary to stiffness. Unable to make a full composite fist. There is mild decreased full extension of the middle and ring.  AIN/PIN/Radial/Median/Ulnar Nerves assessed in isolation without deficit. Radial & Ulnar arteries palpated 2+. Capillary Refill <3s.      RADIOGRAPHS:  Xray left hand 6/25/18  FINDINGS:  Soft tissue swelling is still present about the 3rd and 4th proximal interphalangeal joint.  There is no underlying fracture identified.  The healing 5th metacarpal fracture continues to show progress.  The joint spaces are preserved.    Comments: I have personally reviewed the imaging and I agree with the above radiologist's report.    ASSESSMENT/PLAN:       ICD-10-CM ICD-9-CM   1. Closed displaced fracture of shaft of fifth metacarpal bone of left hand, initial encounter S62.327A 815.03     Orders Placed This Encounter   Procedures    X-Ray Hand Complete Left      Plan:   -continue OT, strengthening as tolerated   -RTC 6 wks with xray        The patient indicates understanding of these issues and agrees to the plan.    Roseanna Ho PA-C  [facility]   [facility]  [place], [place]

## 2018-08-06 ENCOUNTER — OFFICE VISIT (OUTPATIENT)
Dept: ORTHOPEDICS | Facility: CLINIC | Age: 38
End: 2018-08-06
Payer: COMMERCIAL

## 2018-08-06 ENCOUNTER — HOSPITAL ENCOUNTER (OUTPATIENT)
Dept: RADIOLOGY | Facility: OTHER | Age: 38
Discharge: HOME | End: 2018-08-06
Attending: PHYSICIAN ASSISTANT
Payer: COMMERCIAL

## 2018-08-06 VITALS
BODY MASS INDEX: 30.8 KG/M2 | DIASTOLIC BLOOD PRESSURE: 83 MMHG | WEIGHT: 240 LBS | SYSTOLIC BLOOD PRESSURE: 124 MMHG | HEART RATE: 67 BPM | HEIGHT: 74 IN

## 2018-08-06 DIAGNOSIS — S62.327A CLOSED DISPLACED FRACTURE OF SHAFT OF FIFTH METACARPAL BONE OF LEFT HAND, INITIAL ENCOUNTER: ICD-10-CM

## 2018-08-06 DIAGNOSIS — S62.327A CLOSED DISPLACED FRACTURE OF SHAFT OF FIFTH METACARPAL BONE OF LEFT HAND, INITIAL ENCOUNTER: Primary | ICD-10-CM

## 2018-08-06 PROCEDURE — 73130 X-RAY EXAM OF HAND: CPT | Mod: TC,FY,LT

## 2018-08-06 PROCEDURE — 99999 PR PBB SHADOW E&M-EST. PATIENT-LVL III: CPT | Mod: PBBFAC,,, | Performed by: PHYSICIAN ASSISTANT

## 2018-08-06 PROCEDURE — 99213 OFFICE O/P EST LOW 20 MIN: CPT | Mod: S$GLB,,, | Performed by: PHYSICIAN ASSISTANT

## 2018-08-06 PROCEDURE — 3008F BODY MASS INDEX DOCD: CPT | Mod: CPTII,S$GLB,, | Performed by: PHYSICIAN ASSISTANT

## 2018-08-06 PROCEDURE — 73130 X-RAY EXAM OF HAND: CPT | Mod: 26,LT,, | Performed by: RADIOLOGY

## 2018-08-06 NOTE — PROGRESS NOTES
"Subjective:      Patient ID: Jason Madden is a 37 y.o. male.    Chief Complaint: Pain of the Left Hand    8/6/18  Jason Madden presents for follow up left fifth metacarpal fracture sustained 4/4/18. He was last seen 6/25/18. He has been compliant with OT. He does still have stiffness in the fingers, especially the middle and ring. He is unable to make a composite fist- he states this is gradually improving with therapy and the bracing. He has no pain.         Review of patient's allergies indicates:   Allergen Reactions    Bactrim [sulfamethoxazole-trimethoprim] Hives         Current Outpatient Prescriptions   Medication Sig Dispense Refill    ibuprofen (ADVIL,MOTRIN) 800 MG tablet Take 800 mg by mouth 3 (three) times daily.       No current facility-administered medications for this visit.        History reviewed. No pertinent past medical history.    History reviewed. No pertinent surgical history.    Review of Systems:  Constitutional: Negative for chills and fever.   Respiratory: Negative for cough and shortness of breath.    Gastrointestinal: Negative for nausea and vomiting.   Skin: Negative for rash.   Neurological: Negative for dizziness and headaches.   Psychiatric/Behavioral: Negative for depression.   MSK as in HPI       OBJECTIVE:     PHYSICAL EXAM:  /83 (BP Location: Left arm, Patient Position: Sitting, BP Method: Large (Automatic))   Pulse 67   Ht 6' 2" (1.88 m)   Wt 108.9 kg (240 lb)   BMI 30.81 kg/m²     GEN:  NAD, well-developed, well-groomed.  NEURO: Awake, alert, and oriented. Normal attention and concentration.    PSYCH: Normal mood and affect. Behavior is normal.  HEENT: No cervical lymphadenopathy noted.  CARDIOVASCULAR: Radial pulses 2+ bilaterally. No LE edema noted.  PULMONARY: Breath sounds normal. No respiratory distress.  SKIN: Intact, no rashes.      MSK:   LUE:  Good wrist motion, decreased flexion of fingers secondary to stiffness. Unable to make a full composite fist. " There is mild decreased full extension of the middle and ring. No ttp over the fracture site. AIN/PIN/Radial/Median/Ulnar Nerves assessed in isolation without deficit. Radial & Ulnar arteries palpated 2+. Capillary Refill <3s.      RADIOGRAPHS:  Xray left hand 8//6/18  FINDINGS:  The callus about the 5th metacarpal fracture has been compounded and is no longer visualized as a separate entity from the cortex.  The remote fracture line is still present however has healed.  The balance of the exam is stable and within normal limits.    Comments: I have personally reviewed the imaging and I agree with the above radiologist's report.    ASSESSMENT/PLAN:       ICD-10-CM ICD-9-CM   1. Closed displaced fracture of shaft of fifth metacarpal bone of left hand, initial encounter S62.327A 815.03        Plan:   -continue OT/ HEP   -RTC as needed        The patient indicates understanding of these issues and agrees to the plan.    Roseanna Ho PA-C  [facility]   [facility]  [place], [place]

## 2018-11-02 ENCOUNTER — OFFICE VISIT (OUTPATIENT)
Dept: URGENT CARE | Facility: CLINIC | Age: 38
End: 2018-11-02
Payer: COMMERCIAL

## 2018-11-02 VITALS
WEIGHT: 230 LBS | DIASTOLIC BLOOD PRESSURE: 90 MMHG | HEART RATE: 74 BPM | TEMPERATURE: 98 F | BODY MASS INDEX: 29.52 KG/M2 | HEIGHT: 74 IN | RESPIRATION RATE: 18 BRPM | OXYGEN SATURATION: 97 % | SYSTOLIC BLOOD PRESSURE: 146 MMHG

## 2018-11-02 DIAGNOSIS — J02.9 SORE THROAT: ICD-10-CM

## 2018-11-02 DIAGNOSIS — J06.9 VIRAL URI: Primary | ICD-10-CM

## 2018-11-02 PROCEDURE — 99214 OFFICE O/P EST MOD 30 MIN: CPT | Mod: S$GLB,,, | Performed by: NURSE PRACTITIONER

## 2018-11-02 PROCEDURE — 3008F BODY MASS INDEX DOCD: CPT | Mod: CPTII,S$GLB,, | Performed by: NURSE PRACTITIONER

## 2018-11-02 RX ORDER — FLUTICASONE PROPIONATE 50 MCG
1 SPRAY, SUSPENSION (ML) NASAL DAILY
Qty: 1 BOTTLE | Refills: 0 | Status: SHIPPED | OUTPATIENT
Start: 2018-11-02 | End: 2019-11-21

## 2018-11-02 NOTE — PROGRESS NOTES
"Subjective:       Patient ID: Jason Madden is a 37 y.o. male.    Vitals:  height is 6' 2" (1.88 m) and weight is 104.3 kg (230 lb). His temperature is 98 °F (36.7 °C). His blood pressure is 146/90 (abnormal) and his pulse is 74. His respiration is 18 and oxygen saturation is 97%.     Chief Complaint: URI    Patient lives local. Tuesday (3 days)  he started feeling sore throat and post nasal drip. As the week went on he feels like it has gotten worse and now has added ear pain and more pain in throat. Patient has take mucinex for this problem but does not get any relief.        No chills or fever, no N/V/D or known sick contacts.    Has Hx of seasonal allergy.      URI    This is a chronic problem. Episode onset: tuesday. Associated symptoms include congestion, ear pain, a plugged ear sensation, sinus pain, sneezing, a sore throat and swollen glands. Pertinent negatives include no abdominal pain, chest pain, coughing, diarrhea, dysuria, headaches, joint pain, joint swelling, nausea, neck pain, rash, vomiting or wheezing. Treatments tried: mucinex.     Review of Systems   Constitution: Negative for chills, fever and malaise/fatigue.   HENT: Positive for congestion, ear pain, sinus pain, sneezing and sore throat. Negative for hoarse voice.    Eyes: Negative for discharge and redness.   Cardiovascular: Negative for chest pain, dyspnea on exertion and leg swelling.   Respiratory: Negative for cough, shortness of breath, sputum production and wheezing.    Skin: Negative for rash.   Musculoskeletal: Negative for joint pain, myalgias and neck pain.   Gastrointestinal: Negative for abdominal pain, diarrhea, nausea and vomiting.   Genitourinary: Negative for dysuria.   Neurological: Negative for headaches.   All other systems reviewed and are negative.      Objective:      Physical Exam   Constitutional: He is oriented to person, place, and time. He appears well-developed and well-nourished. He is cooperative.  Non-toxic " appearance. He does not appear ill. No distress.   HENT:   Head: Normocephalic and atraumatic.   Right Ear: Hearing, external ear and ear canal normal. Tympanic membrane is injected.   Left Ear: Hearing, tympanic membrane, external ear and ear canal normal.   Nose: Rhinorrhea present. No mucosal edema or nasal deformity. No epistaxis. Right sinus exhibits no maxillary sinus tenderness and no frontal sinus tenderness. Left sinus exhibits no maxillary sinus tenderness and no frontal sinus tenderness.   Mouth/Throat: Uvula is midline and mucous membranes are normal. No trismus in the jaw. Normal dentition. No uvula swelling. Posterior oropharyngeal erythema present.   Eyes: Conjunctivae and lids are normal. No scleral icterus.   Sclera clear bilat   Neck: Trachea normal, full passive range of motion without pain and phonation normal. Neck supple.   Cardiovascular: Normal rate, regular rhythm, normal heart sounds, intact distal pulses and normal pulses.   Pulmonary/Chest: Effort normal and breath sounds normal. No stridor. No respiratory distress. He has no decreased breath sounds. He has no wheezes.   Abdominal: Soft. Normal appearance and bowel sounds are normal. He exhibits no distension. There is no tenderness.   Musculoskeletal: Normal range of motion. He exhibits no edema or deformity.   Neurological: He is alert and oriented to person, place, and time. He exhibits normal muscle tone. Coordination normal.   Skin: Skin is warm, dry and intact. He is not diaphoretic. No pallor.   Psychiatric: He has a normal mood and affect. His speech is normal and behavior is normal. Judgment and thought content normal. Cognition and memory are normal.   Nursing note and vitals reviewed.      Assessment:       1. Viral URI    2. Sore throat        Plan:         Viral URI  -     fluticasone (FLONASE) 50 mcg/actuation nasal spray; 1 spray (50 mcg total) by Each Nare route once daily.  Dispense: 1 Bottle; Refill: 0    Sore throat  -      (Magic mouthwash) 1:1:1 Benadryl 12.5mg/5ml liq, aluminum & magnesium hydroxide-simehticone (Maalox), lidocaine viscous 2%; Swish and spit 5 mLs every 4 (four) hours as needed (gargle and spit). for mouth sores  Dispense: 360 mL; Refill: 0      Patient Instructions     Please drink plenty of fluids.  Please get plenty of rest.  Please return here or go to the Emergency Department for any concerns or worsening of condition.  If you do not have Hypertension or any history of palpitations, it is ok to take over the counter Sudafed or Mucinex D or Allegra-D or Claritin-D or Zyrtec-D.  If you do take one of the above, it is ok to combine that with plain over the counter Mucinex or Allegra or Claritin or Zyrtec.  If for example you are taking Zyrtec -D, you can combine that with Mucinex, but not Mucinex-D.  If you are taking Mucinex-D, you can combine that with plain Allegra or Claritin or Zyrtec.   If you do have Hypertension or palpitations, it is safe to take Coricidin HBP for relief of sinus symptoms.  If not allergic, please take over the counter Tylenol (Acetaminophen) and/or Motrin (Ibuprofen) as directed for control of pain and/or fever.  Please follow up with your primary care doctor or specialist as needed.    If you  smoke, please stop smoking.    Viral Upper Respiratory Illness (Adult)  You have a viral upper respiratory illness (URI), which is another term for the common cold. This illness is contagious during the first few days. It is spread through the air by coughing and sneezing. It may also be spread by direct contact (touching the sick person and then touching your own eyes, nose, or mouth). Frequent handwashing will decrease risk of spread. Most viral illnesses go away within 7 to 10 days with rest and simple home remedies. Sometimes the illness may last for several weeks. Antibiotics will not kill a virus, and they are generally not prescribed for this condition.    Home care  · If symptoms are  severe, rest at home for the first 2 to 3 days. When you resume activity, don't let yourself get too tired.  · Avoid being exposed to cigarette smoke (yours or others).  · You may use acetaminophen or ibuprofen to control pain and fever, unless another medicine was prescribed. (Note: If you have chronic liver or kidney disease, have ever had a stomach ulcer or gastrointestinal bleeding, or are taking blood-thinning medicines, talk with your healthcare provider before using these medicines.) Aspirin should never be given to anyone under 18 years of age who is ill with a viral infection or fever. It may cause severe liver or brain damage.  · Your appetite may be poor, so a light diet is fine. Avoid dehydration by drinking 6 to 8 glasses of fluids per day (water, soft drinks, juices, tea, or soup). Extra fluids will help loosen secretions in the nose and lungs.  · Over-the-counter cold medicines will not shorten the length of time youre sick, but they may be helpful for the following symptoms: cough, sore throat, and nasal and sinus congestion. (Note: Do not use decongestants if you have high blood pressure.)  Follow-up care  Follow up with your healthcare provider, or as advised.  When to seek medical advice  Call your healthcare provider right away if any of these occur:  · Cough with lots of colored sputum (mucus)  · Severe headache; face, neck, or ear pain  · Difficulty swallowing due to throat pain  · Fever of 100.4°F (38°C)  Call 911, or get immediate medical care  Call emergency services right away if any of these occur:  · Chest pain, shortness of breath, wheezing, or difficulty breathing  · Coughing up blood  · Inability to swallow due to throat pain  Date Last Reviewed: 9/13/2015 © 2000-2017 [employer]. [address] [place] [address], [place] [address]. All rights reserved. This information is not intended as a substitute for professional medical care. Always follow your healthcare professional's  instructions.

## 2018-11-02 NOTE — PATIENT INSTRUCTIONS
Please drink plenty of fluids.  Please get plenty of rest.  Please return here or go to the Emergency Department for any concerns or worsening of condition.  If you do not have Hypertension or any history of palpitations, it is ok to take over the counter Sudafed or Mucinex D or Allegra-D or Claritin-D or Zyrtec-D.  If you do take one of the above, it is ok to combine that with plain over the counter Mucinex or Allegra or Claritin or Zyrtec.  If for example you are taking Zyrtec -D, you can combine that with Mucinex, but not Mucinex-D.  If you are taking Mucinex-D, you can combine that with plain Allegra or Claritin or Zyrtec.   If you do have Hypertension or palpitations, it is safe to take Coricidin HBP for relief of sinus symptoms.  If not allergic, please take over the counter Tylenol (Acetaminophen) and/or Motrin (Ibuprofen) as directed for control of pain and/or fever.  Please follow up with your primary care doctor or specialist as needed.    If you  smoke, please stop smoking.    Viral Upper Respiratory Illness (Adult)  You have a viral upper respiratory illness (URI), which is another term for the common cold. This illness is contagious during the first few days. It is spread through the air by coughing and sneezing. It may also be spread by direct contact (touching the sick person and then touching your own eyes, nose, or mouth). Frequent handwashing will decrease risk of spread. Most viral illnesses go away within 7 to 10 days with rest and simple home remedies. Sometimes the illness may last for several weeks. Antibiotics will not kill a virus, and they are generally not prescribed for this condition.    Home care  · If symptoms are severe, rest at home for the first 2 to 3 days. When you resume activity, don't let yourself get too tired.  · Avoid being exposed to cigarette smoke (yours or others).  · You may use acetaminophen or ibuprofen to control pain and fever, unless another medicine was  prescribed. (Note: If you have chronic liver or kidney disease, have ever had a stomach ulcer or gastrointestinal bleeding, or are taking blood-thinning medicines, talk with your healthcare provider before using these medicines.) Aspirin should never be given to anyone under 18 years of age who is ill with a viral infection or fever. It may cause severe liver or brain damage.  · Your appetite may be poor, so a light diet is fine. Avoid dehydration by drinking 6 to 8 glasses of fluids per day (water, soft drinks, juices, tea, or soup). Extra fluids will help loosen secretions in the nose and lungs.  · Over-the-counter cold medicines will not shorten the length of time youre sick, but they may be helpful for the following symptoms: cough, sore throat, and nasal and sinus congestion. (Note: Do not use decongestants if you have high blood pressure.)  Follow-up care  Follow up with your healthcare provider, or as advised.  When to seek medical advice  Call your healthcare provider right away if any of these occur:  · Cough with lots of colored sputum (mucus)  · Severe headache; face, neck, or ear pain  · Difficulty swallowing due to throat pain  · Fever of 100.4°F (38°C)  Call 911, or get immediate medical care  Call emergency services right away if any of these occur:  · Chest pain, shortness of breath, wheezing, or difficulty breathing  · Coughing up blood  · Inability to swallow due to throat pain  Date Last Reviewed: 9/13/2015  © 3500-0091 [employer]. [address] [place] [address], [place] [address]. All rights reserved. This information is not intended as a substitute for professional medical care. Always follow your healthcare professional's instructions.

## 2018-11-05 ENCOUNTER — TELEPHONE (OUTPATIENT)
Dept: URGENT CARE | Facility: CLINIC | Age: 38
End: 2018-11-05

## 2018-11-28 ENCOUNTER — OFFICE VISIT (OUTPATIENT)
Dept: URGENT CARE | Facility: CLINIC | Age: 38
End: 2018-11-28
Payer: COMMERCIAL

## 2018-11-28 VITALS
HEIGHT: 74 IN | RESPIRATION RATE: 16 BRPM | HEART RATE: 74 BPM | WEIGHT: 230 LBS | DIASTOLIC BLOOD PRESSURE: 92 MMHG | TEMPERATURE: 98 F | OXYGEN SATURATION: 98 % | SYSTOLIC BLOOD PRESSURE: 149 MMHG | BODY MASS INDEX: 29.52 KG/M2

## 2018-11-28 DIAGNOSIS — H10.11 ALLERGIC CONJUNCTIVITIS OF RIGHT EYE: Primary | ICD-10-CM

## 2018-11-28 PROCEDURE — 99214 OFFICE O/P EST MOD 30 MIN: CPT | Mod: S$GLB,,, | Performed by: NURSE PRACTITIONER

## 2018-11-28 PROCEDURE — 3008F BODY MASS INDEX DOCD: CPT | Mod: CPTII,S$GLB,, | Performed by: NURSE PRACTITIONER

## 2018-11-28 RX ORDER — OLOPATADINE HYDROCHLORIDE 1 MG/ML
1 SOLUTION/ DROPS OPHTHALMIC 2 TIMES DAILY
Qty: 5 ML | Refills: 0 | Status: SHIPPED | OUTPATIENT
Start: 2018-11-28 | End: 2019-11-21

## 2018-11-28 NOTE — PATIENT INSTRUCTIONS
Conjunctivitis, Allergic    Conjunctivitis is an irritation of a thin membrane in the eye. This membrane is called the conjunctiva. It covers the white of the eye and the inside of the eyelid. The condition is often known as pink eye or red eye because the eye looks pink or red. The eye can also be swollen. A thick fluid may leak from the eyelid. The eye may itch and burn, and feel gritty or scratchy.  Allergic conjunctivitis is caused by an allergen. Allergens are substances that cause the body to react with certain symptoms. Allergens that cause eye irritation include things such as house dust or pollen in the air. This can occur seasonally, most often in the spring.  Home care  · Eye drops may be prescribed to reduce itching and redness. Use these as directed. Otherwise, over-the-counter decongestant eye drops may be used.  · Apply a cool compress (towel soaked in cool water) to the affected eye 3 to 4 times a day to reduce swelling and itching.  · It is common to have mucus drainage during the night, causing the eyelids to become crusted by morning. Use a warm, wet cloth to wipe this away. You may also use saline irrigating solution or artificial tears to rinse away mucus in the eye. Do not patch the eye.  · You may use acetaminophen or ibuprofen to control pain, unless another medicine was prescribed. (Note: If you have chronic liver or kidney disease, or if you have ever had a stomach ulcer or gastrointestinal bleeding, talk with your healthcare provider before using these medicines.)  · Do not wear contact lenses until your eyes have healed and all symptoms are gone.  Follow-up care  Follow up with your healthcare provider, or as advised.  When to seek medical advice  Call your healthcare provider right away if any of these occur:  · Increased eyelid swelling  · New or worsening drainage from the eye  · Increasing redness around the eye  · Facial swelling  Date Last Reviewed: 6/14/2015  © 7743-5450 The  "[employer]. [address] [place] [address], [place] [address]. All rights reserved. This information is not intended as a substitute for professional medical care. Always follow your healthcare professional's instructions.

## 2018-11-28 NOTE — PROGRESS NOTES
"Subjective:       Patient ID: Jason Madden is a 38 y.o. male.    Vitals:  height is 6' 2" (1.88 m) and weight is 104.3 kg (230 lb). His temperature is 97.9 °F (36.6 °C). His blood pressure is 149/92 (abnormal) and his pulse is 74. His respiration is 16 and oxygen saturation is 98%.     Chief Complaint: Eye Problem    Patient [place], he states to have noticed some itchness in left eye last night. Patient woke up this morning and had some discharge and a foreign body sensation. Patient thought it could just be allergies so he took some alergra and eye eye drops but doesn't not feel likes it is getting much better.      Eye Problem    The left eye is affected. This is a new problem. The current episode started yesterday. There is no known exposure to pink eye. Associated symptoms include an eye discharge, eye redness, a foreign body sensation and itching.       Eyes: Positive for eye discharge, eye itching and eye redness.       Objective:      Physical Exam    Assessment:       No diagnosis found.    Plan:         There are no diagnoses linked to this encounter.     "

## 2018-11-29 NOTE — PROGRESS NOTES
"Subjective:       Patient ID: Jason Madden is a 38 y.o. male.    Vitals:  height is 6' 2" (1.88 m) and weight is 104.3 kg (230 lb). His temperature is 97.9 °F (36.6 °C). His blood pressure is 149/92 (abnormal) and his pulse is 74. His respiration is 16 and oxygen saturation is 98%.     Chief Complaint: Eye Problem    Eye Problem    The left eye is affected. The current episode started yesterday. The injury mechanism was a foreign body. There is no known exposure to pink eye. Associated symptoms include an eye discharge, eye redness and a foreign body sensation. He has tried commercial eye wash for the symptoms. The treatment provided mild relief.       Eyes: Positive for eye discharge and eye redness.       Objective:      Physical Exam    Assessment:       1. Allergic conjunctivitis of right eye        Plan:         Allergic conjunctivitis of right eye  -     olopatadine (PATANOL) 0.1 % ophthalmic solution; Place 1 drop into the right eye 2 (two) times daily. for 7 days  Dispense: 5 mL; Refill: 0         "

## 2018-11-29 NOTE — PROGRESS NOTES
"Subjective:       Patient ID: Jason Madden is a 38 y.o. male.    Vitals:  height is 6' 2" (1.88 m) and weight is 104.3 kg (230 lb). His temperature is 97.9 °F (36.6 °C). His blood pressure is 149/92 (abnormal) and his pulse is 74. His respiration is 16 and oxygen saturation is 98%.     Chief Complaint: Eye Problem    Eye Problem    The left eye is affected. The current episode started yesterday. There is no known exposure to pink eye. Associated symptoms include an eye discharge, eye redness and a foreign body sensation (resolved). He has tried commercial eye wash for the symptoms. The treatment provided mild relief. No trauma to eye.    Discussed elevated B/P. Patient states he does not have HTN and typically has lower B/P.  Advised him to monitor and report to PCP if stays elevated.        Eye Problem    The left eye is affected. This is a new problem. The current episode started yesterday. There is no known exposure to pink eye. He does not wear contacts. Associated symptoms include an eye discharge, eye redness and itching. Pertinent negatives include no blurred vision, double vision, fever, nausea, photophobia or vomiting. He has tried commercial eye wash for the symptoms. The treatment provided mild relief.       Constitution: Negative for chills and fever.   HENT: Negative for congestion and sinus pain.    Eyes: Positive for eye discharge, eye itching and eye redness. Negative for eye trauma, foreign body in eye, eye pain, photophobia, vision loss, double vision, blurred vision and eyelid swelling.   Gastrointestinal: Negative for nausea and vomiting.   Skin: Negative for rash.   Allergic/Immunologic: Negative for seasonal allergies and itching.   Neurological: Negative for headaches.       Objective:      Physical Exam   Constitutional: He is oriented to person, place, and time. He appears well-developed and well-nourished.   HENT:   Head: Normocephalic and atraumatic.   Right Ear: External ear normal. "   Left Ear: External ear normal.   Nose: Nose normal.   Mouth/Throat: Uvula is midline. Posterior oropharyngeal erythema present.   Eyes: EOM and lids are normal. Pupils are equal, round, and reactive to light. Lids are everted and swept, no foreign bodies found. Left eye exhibits discharge. No foreign body present in the left eye. Left conjunctiva is injected.   Watery discharge   Neck: Trachea normal, full passive range of motion without pain and phonation normal. Neck supple.   Pulmonary/Chest: Effort normal and breath sounds normal. No stridor. He has no decreased breath sounds. He has no wheezes.   Musculoskeletal: Normal range of motion.   Neurological: He is alert and oriented to person, place, and time.   Skin: Skin is warm, dry and intact.   Psychiatric: He has a normal mood and affect. His speech is normal and behavior is normal. Judgment and thought content normal. Cognition and memory are normal.   Nursing note and vitals reviewed.      Assessment:       1. Allergic conjunctivitis of right eye        Plan:         Allergic conjunctivitis of right eye  -     olopatadine (PATANOL) 0.1 % ophthalmic solution; Place 1 drop into the right eye 2 (two) times daily. for 7 days  Dispense: 5 mL; Refill: 0      Patient Instructions     Conjunctivitis, Allergic    Conjunctivitis is an irritation of a thin membrane in the eye. This membrane is called the conjunctiva. It covers the white of the eye and the inside of the eyelid. The condition is often known as pink eye or red eye because the eye looks pink or red. The eye can also be swollen. A thick fluid may leak from the eyelid. The eye may itch and burn, and feel gritty or scratchy.  Allergic conjunctivitis is caused by an allergen. Allergens are substances that cause the body to react with certain symptoms. Allergens that cause eye irritation include things such as house dust or pollen in the air. This can occur seasonally, most often in the spring.  Home  care  · Eye drops may be prescribed to reduce itching and redness. Use these as directed. Otherwise, over-the-counter decongestant eye drops may be used.  · Apply a cool compress (towel soaked in cool water) to the affected eye 3 to 4 times a day to reduce swelling and itching.  · It is common to have mucus drainage during the night, causing the eyelids to become crusted by morning. Use a warm, wet cloth to wipe this away. You may also use saline irrigating solution or artificial tears to rinse away mucus in the eye. Do not patch the eye.  · You may use acetaminophen or ibuprofen to control pain, unless another medicine was prescribed. (Note: If you have chronic liver or kidney disease, or if you have ever had a stomach ulcer or gastrointestinal bleeding, talk with your healthcare provider before using these medicines.)  · Do not wear contact lenses until your eyes have healed and all symptoms are gone.  Follow-up care  Follow up with your healthcare provider, or as advised.  When to seek medical advice  Call your healthcare provider right away if any of these occur:  · Increased eyelid swelling  · New or worsening drainage from the eye  · Increasing redness around the eye  · Facial swelling  Date Last Reviewed: 6/14/2015  © 5020-5267 [employer]. [address] [place] [address], [place] [address]. All rights reserved. This information is not intended as a substitute for professional medical care. Always follow your healthcare professional's instructions.

## 2018-12-01 ENCOUNTER — TELEPHONE (OUTPATIENT)
Dept: URGENT CARE | Facility: CLINIC | Age: 38
End: 2018-12-01

## 2018-12-11 ENCOUNTER — OFFICE VISIT (OUTPATIENT)
Dept: URGENT CARE | Facility: CLINIC | Age: 38
End: 2018-12-11
Payer: COMMERCIAL

## 2018-12-11 VITALS
OXYGEN SATURATION: 97 % | HEIGHT: 75 IN | SYSTOLIC BLOOD PRESSURE: 148 MMHG | RESPIRATION RATE: 12 BRPM | TEMPERATURE: 97 F | BODY MASS INDEX: 28.6 KG/M2 | DIASTOLIC BLOOD PRESSURE: 94 MMHG | WEIGHT: 230 LBS | HEART RATE: 68 BPM

## 2018-12-11 DIAGNOSIS — M25.572 PAIN, JOINT, ANKLE AND FOOT, LEFT: ICD-10-CM

## 2018-12-11 DIAGNOSIS — S99.912A INJURY OF LEFT ANKLE, INITIAL ENCOUNTER: ICD-10-CM

## 2018-12-11 DIAGNOSIS — S92.255A CLOSED NONDISPLACED FRACTURE OF NAVICULAR BONE OF LEFT FOOT, INITIAL ENCOUNTER: Primary | ICD-10-CM

## 2018-12-11 PROCEDURE — 99214 OFFICE O/P EST MOD 30 MIN: CPT | Mod: S$GLB,,, | Performed by: NURSE PRACTITIONER

## 2018-12-11 PROCEDURE — 3008F BODY MASS INDEX DOCD: CPT | Mod: CPTII,S$GLB,, | Performed by: NURSE PRACTITIONER

## 2018-12-11 PROCEDURE — 73630 X-RAY EXAM OF FOOT: CPT | Mod: FY,LT,S$GLB, | Performed by: RADIOLOGY

## 2018-12-11 PROCEDURE — 73610 X-RAY EXAM OF ANKLE: CPT | Mod: FY,LT,S$GLB, | Performed by: RADIOLOGY

## 2018-12-11 RX ORDER — NAPROXEN 500 MG/1
500 TABLET ORAL 2 TIMES DAILY WITH MEALS
Qty: 10 TABLET | Refills: 0 | Status: SHIPPED | OUTPATIENT
Start: 2018-12-11 | End: 2018-12-11 | Stop reason: CLARIF

## 2018-12-12 ENCOUNTER — TELEPHONE (OUTPATIENT)
Dept: SPORTS MEDICINE | Facility: CLINIC | Age: 38
End: 2018-12-12

## 2018-12-12 ENCOUNTER — OFFICE VISIT (OUTPATIENT)
Dept: SPORTS MEDICINE | Facility: CLINIC | Age: 38
End: 2018-12-12
Payer: COMMERCIAL

## 2018-12-12 VITALS
HEIGHT: 75 IN | SYSTOLIC BLOOD PRESSURE: 156 MMHG | HEART RATE: 71 BPM | WEIGHT: 230 LBS | DIASTOLIC BLOOD PRESSURE: 90 MMHG | BODY MASS INDEX: 28.6 KG/M2

## 2018-12-12 DIAGNOSIS — S92.255A CLOSED NONDISPLACED FRACTURE OF NAVICULAR BONE OF LEFT FOOT, INITIAL ENCOUNTER: ICD-10-CM

## 2018-12-12 DIAGNOSIS — M25.572 LEFT ANKLE PAIN, UNSPECIFIED CHRONICITY: Primary | ICD-10-CM

## 2018-12-12 PROCEDURE — 99214 OFFICE O/P EST MOD 30 MIN: CPT | Mod: S$GLB,,, | Performed by: ORTHOPAEDIC SURGERY

## 2018-12-12 PROCEDURE — 3008F BODY MASS INDEX DOCD: CPT | Mod: CPTII,S$GLB,, | Performed by: ORTHOPAEDIC SURGERY

## 2018-12-12 PROCEDURE — 99999 PR PBB SHADOW E&M-EST. PATIENT-LVL III: CPT | Mod: PBBFAC,,, | Performed by: ORTHOPAEDIC SURGERY

## 2018-12-12 RX ORDER — MELOXICAM 15 MG/1
15 TABLET ORAL DAILY
Qty: 30 TABLET | Refills: 2 | Status: SHIPPED | OUTPATIENT
Start: 2018-12-12 | End: 2019-01-11

## 2018-12-12 NOTE — TELEPHONE ENCOUNTER
Spoke with pt in regards of arriving early for his appt. Pt could not due to work. Kept pt appt time the same this evening.

## 2018-12-12 NOTE — PROGRESS NOTES
Subjective:          Chief Complaint: Jason Madden is a 38 y.o. male who had concerns including Injury of the Left Ankle.    37yo M presents for evaluation of L ankle. Last week he was riding in a golf cart w/ his leg out when he hit the dorsum of his foot and forcefully plantar flexed it and may have externally rotated it. He developed swelling and has had pain in the anterior ankle since then. He was seen in urgent care and had XR performed and was given a walking boot. He has been WBAT in a walking boot and notes that the pain is much better when using the walking boot. Denies N/T into the foot. Did not have any plantar ecchymosis.            Review of Systems   Constitution: Negative for fever and night sweats.   HENT: Negative for hearing loss.    Eyes: Negative for blurred vision and visual disturbance.   Cardiovascular: Negative for chest pain and leg swelling.   Respiratory: Negative for shortness of breath.    Endocrine: Negative for polyuria.   Hematologic/Lymphatic: Negative for bleeding problem.   Skin: Negative for rash.   Musculoskeletal: Negative for back pain, joint pain, joint swelling, muscle cramps and muscle weakness.   Gastrointestinal: Negative for melena.   Genitourinary: Negative for hematuria.   Neurological: Negative for loss of balance, numbness and paresthesias.   Psychiatric/Behavioral: Negative for altered mental status.       Pain Related Questions  Over the past 3 days, what was your average pain during activity? (I.e. running, jogging, walking, climbing stairs, getting dressed, ect.): 4  Over the past 3 days, what was your highest pain level?: 4  Over the past 3 days, what was your lowest pain level? : 0    Other  How many nights a week are you awakened by your affected body part?: 0  Was the patient's HEIGHT measured or patient reported?: Patient Reported  Was the patient's WEIGHT measured or patient reported?: Patient Reported      Objective:        General: Jason is  well-developed, well-nourished, appears stated age, in no acute distress, alert and oriented to time, place and person.     General    Vitals reviewed.  Constitutional: He is oriented to person, place, and time. He appears well-developed and well-nourished. No distress.   HENT:   Mouth/Throat: No oropharyngeal exudate.   Eyes: Right eye exhibits no discharge. Left eye exhibits no discharge.   Neck: Normal range of motion.   Cardiovascular: Normal rate.    Pulmonary/Chest: Breath sounds normal. No respiratory distress.   Neurological: He is alert and oriented to person, place, and time. He has normal reflexes. No cranial nerve deficit. Coordination normal.   Psychiatric: He has a normal mood and affect. His behavior is normal. Judgment and thought content normal.     General Musculoskeletal Exam   Gait: normal     Right Ankle/Foot Exam     Inspection   Scars: absent  Deformity: absent  Erythema: absent  Bruising: Ankle - absent Foot - absent  Effusion: Ankle - absent Foot - absent  Atrophy: Ankle - absent Foot - absent    Range of Motion   Ankle Joint   Dorsiflexion:  10 normal   Plantar flexion:  35 normal   Subtalar Joint   Inversion:  15 normal   Eversion:  5 normal   Tony Test:  negative  First MTP Joint: normal    Alignment   Knee Alignment: neutral  Hindfoot Alignment: neutral  Midfoot Alignment: normal  Forefoot Alignment: normal    Tests   Anterior drawer: 1+  Varus tilt: 1+  Heel Walk: able to perform  Tiptoe Walk: able to perform  Single Heel Rise: able to perform  External Rotation Test: negative  Squeeze Test: negative    Other   Ankle Crepitus: absent  Foot Crepitus:  absent  Sensation: normal  Peroneal Subluxation: negative    Left Ankle/Foot Exam     Inspection  Deformity: absent  Erythema: absent  Bruising: Ankle - absent Foot - absent  Effusion: Ankle - absent Foot - absent  Atrophy: Ankle - absent Foot - absent  Scars: absent    Swelling   The patient is swollen on the medial talar  dome.    Tenderness   The patient is tender to palpation of the ATF and medial malleolus.    Range of Motion   Ankle Joint  Dorsiflexion:  10 normal   Plantar flexion:  35 normal     Subtalar Joint   Inversion:  15 normal   Eversion:  5 normal   Tony Test:  normal  First MTP Joint: normal    Alignment   Knee Alignment: neutral  Hindfoot Alignment: neutral  Midfoot Alignment: normal  Forefoot Alignment: normal    Tests   Anterior drawer: 1+  Varus tilt: 1+  Heel Walk: able to perform  Tiptoe Walk: able to perform  Single Heel Rise: able to perform  External Rotation Test: negative  Squeeze Test: absent    Other   Foot Crepitus:  absent  Ankle Crepitus: absent  Sensation: normal  Peroneal Subluxation: negative      Muscle Strength   Right Lower Extremity   Anterior tibial:  5/5/5  Posterior tibial:  5/5/5  Gastrocsoleus:  5/5/5  Peroneal muscle:  5/5/5  EHL:  5/5  FDL: 5/5  EDL: 5/5  FHL: 5/5  Left Lower Extremity   Anterior tibial:  5/5/5   Posterior tibial:  5/5/5  Gastrocsoleus:  5/5/5  Peroneal muscle:  5/5/5  EHL:  5/5  FDL: 5/5  EDL: 5/5  FHL: 5/5    Reflexes     Left Side  Post Tibial:  2+  Achilles:  2+  Plantar Reflex: 2+    Right Side   Post Tibial:  2+  Achilles:  2+  Plantar Reflex: 2+    Vascular Exam     Right Pulses  Dorsalis Pedis:      2+  Posterior Tibial:      2+        Left Pulses  Dorsalis Pedis:      2+  Posterior Tibial:      2+        Edema  Right Lower Leg: absent  Left Lower Leg: absent              Assessment:       Encounter Diagnoses   Name Primary?    Left ankle pain, unspecified chronicity Yes    Closed nondisplaced fracture of navicular bone of left foot, initial encounter           Plan:       1) Ankle/foot function, SF-12 was not filled out today in clinic.     RTC in 1 weeks with Chris Kennedy MD for repeat evaluation and MRI/CT review. Patient will not fill out Ankle/foot function, SF-12 on return.    2) Meloxicam ordered    3) MRI and CT of L ankle ordered to evaluate soft  tissues as well as potential navicular fracture    4) WBAT in CAM boot                [facility] patient questionnaires have been collected today.

## 2018-12-12 NOTE — PATIENT INSTRUCTIONS
Orthopedic Fracture Discharge Instructions  If your condition worsens or fails to improve we recommend that you receive another evaluation at the ER immediately or contact your PCP to discuss your concerns or return here. You must understand that you've received an urgent care treatment only and that you may be released before all your medical problems are known or treated. You the patient will arrange for follouwp care as instructed.   Follow up with your Orthopedist within the next 48-72hrs for more evaluation and management of your condition.    If you were prescribed a narcotic or muscle relaxant do not drive or operate heavy machinery while taking these medications   Tylenol can also be used as directed for pain unless you have an allergy to them or medical condition such as stomach ulcers, kidney or liver disease or blood thinners etc for which you should not be taking these type of medications.   Avoid NSAIDs like Ibuprofen, Aleve, Advil, Motrin, etc.; as they can cause a delay in healing of the bone.    RICE which means rest, ice compression and elevation are helpful.   If you were given a splint wear it at all times.   If you were given crutches use them as we instructed. Do not rest your armpits on the foam pad or you risk compressing the nerves and the vessels there   You may take over the counter Calcium 1000 mg daily and Vitamin D3 600 IU daily for 4-6 weeks to help with the bone healing process. If you are not allergic or do not have any contraindications.       General Referral to [facility]  You were referred to [facility] to Establish Care and Management of your condition.  Please call [phone] to schedule your appointment.    Please return here or go to the Emergency Department for any concerns or worsening of condition.  Please follow up with your primary care doctor or specialist in the next 48-72hrs as needed.    If you  smoke, please stop  smoking.  R.I.C.E.    R.I.C.E. stands for Rest, Ice, Compression, and Elevation. Doing these things helps limit pain and swelling after an injury. R.I.C.E. also helps injuries heal faster. Use R.I.C.E. for sprains, strains, and severe bruises or bumps. Follow the tips on this handout and begin R.I.C.E. as soon as possible after an injury.  ? Rest  Pain is your bodys way of telling you to rest an injured area. Whether you have hurt an elbow, hand, foot, or knee, limiting its use will prevent further injury and help you heal.  ? Ice  Applying ice right after an injury helps prevent swelling and reduce pain. Dont place ice directly on your skin.  · Wrap a cold pack or bag of ice in a thin cloth. Place it over the injured area.  · Ice for 10 minutes every 3 hours. Dont ice for more than 20 minutes at a time.  ? Compression  Putting pressure (compression) on an injury helps prevent swelling and provides support.  · Wrap the injured area firmly with an elastic bandage. If your hand or foot tingles, becomes discolored, or feels cold to the touch, the bandage may be too tight. Rewrap it more loosely.  · If your bandage becomes too loose, rewrap it.  · Do not wear an elastic bandage overnight.  ? Elevation  Keeping an injury elevated helps reduce swelling, pain, and throbbing. Elevation is most effective when the injury is kept elevated higher than the heart.     Call your healthcare provider if you notice any of the following:  · Fingers or toes feel numb, are cold to the touch, or change color  · Skin looks shiny or tight  · Pain, swelling, or bruising worsens and is not improved with elevation   Date Last Reviewed: 9/3/2015  © 1036-7952 [email or web address]. [address] [place] [address], [place] [address]. All rights reserved. This information is not intended as a substitute for professional medical care. Always follow your healthcare professional's instructions.

## 2018-12-12 NOTE — LETTER
December 12, 2018      Kimberly Painter, NP  [address] [facility] [address]           [facility]  [address] [facility] [address]  Phone: [phone]          Patient: Jason Madden   MR Number: [id number]   YOB: 1980   Date of Visit: 12/12/2018       Dear Kimberly Painter:    Thank you for referring Jason Madden to me for evaluation. Attached you will find relevant portions of my assessment and plan of care.    If you have questions, please do not hesitate to call me. I look forward to following Jason Madden along with you.    Sincerely,    Too Liu MD    Enclosure  CC:  No Recipients    If you would like to receive this communication electronically, please contact [email or web address] or [phone] to request more information on [email or web address] Link access.    For providers and/or their staff who would like to refer a patient to [facility], please contact us through our one-stop-shop provider referral line, [facility], at [phone].    If you feel you have received this communication in error or would no longer like to receive these types of communications, please e-mail [email or web address]

## 2018-12-12 NOTE — PROGRESS NOTES
"Subjective:       Patient ID: Jason Madden is a 38 y.o. male.    Vitals:  height is 6' 3" (1.905 m) and weight is 104.3 kg (230 lb). His tympanic temperature is 97 °F (36.1 °C). His blood pressure is 148/94 (abnormal) and his pulse is 68. His respiration is 12 and oxygen saturation is 97%.     Chief Complaint: Ankle Injury    Patient states he injured his left ankle approximately 2 [place]/2 - 3 [place] He states that hit it against a curb.  He has tried ice, elevation, and Advil and states that it gives it a moderate relief.       Ankle Injury    The incident occurred more than 1 week ago. [place] park. The injury mechanism was a direct blow. The pain is present in the left ankle. The quality of the pain is described as aching and shooting. The pain is at a severity of 4/10. The pain is mild. The pain has been intermittent since onset. Pertinent negatives include no inability to bear weight, loss of motion, loss of sensation, muscle weakness, numbness or tingling. He reports no foreign bodies present. The symptoms are aggravated by movement and weight bearing. He has tried ice, elevation and NSAIDs for the symptoms. The treatment provided moderate relief.       Constitution: Negative for fatigue.   HENT: Negative for facial swelling and facial trauma.    Neck: Negative for neck stiffness.   Cardiovascular: Negative for chest trauma.   Eyes: Negative for eye trauma, double vision and blurred vision.   Gastrointestinal: Negative for abdominal trauma, abdominal pain and rectal bleeding.   Genitourinary: Negative for hematuria, genital trauma and pelvic pain.   Musculoskeletal: Positive for pain, trauma, joint pain and joint swelling. Negative for abnormal ROM of joint and pain with walking.   Skin: Negative for color change, wound, abrasion and laceration.   Neurological: Negative for dizziness, history of vertigo, light-headedness, coordination disturbances, altered mental " status, loss of consciousness and numbness.   Hematologic/Lymphatic: Negative for history of bleeding disorder.   Psychiatric/Behavioral: Negative for altered mental status.       Objective:      Physical Exam   Constitutional: He is oriented to person, place, and time. Vital signs are normal. He appears well-developed and well-nourished. He is cooperative.  Non-toxic appearance. He does not have a sickly appearance. He does not appear ill. No distress.   HENT:   Head: Normocephalic and atraumatic.   Right Ear: Hearing, tympanic membrane, external ear and ear canal normal.   Left Ear: Hearing, tympanic membrane, external ear and ear canal normal.   Nose: Nose normal. No mucosal edema, rhinorrhea or nasal deformity. No epistaxis. Right sinus exhibits no maxillary sinus tenderness and no frontal sinus tenderness. Left sinus exhibits no maxillary sinus tenderness and no frontal sinus tenderness.   Mouth/Throat: Uvula is midline, oropharynx is clear and moist and mucous membranes are normal. No trismus in the jaw. Normal dentition. No uvula swelling. No posterior oropharyngeal erythema.   Eyes: Conjunctivae, EOM and lids are normal. Pupils are equal, round, and reactive to light. Right eye exhibits no discharge. Left eye exhibits no discharge. No scleral icterus.   Sclera clear bilat   Neck: Trachea normal, normal range of motion, full passive range of motion without pain and phonation normal. Neck supple.   Cardiovascular: Normal rate, regular rhythm, normal heart sounds, intact distal pulses and normal pulses.   Pulmonary/Chest: Effort normal and breath sounds normal. No respiratory distress.   Abdominal: Soft. Normal appearance and bowel sounds are normal. He exhibits no distension, no pulsatile midline mass and no mass. There is no tenderness.   Musculoskeletal: Normal range of motion. He exhibits no edema or deformity.        Left ankle: He exhibits normal range of motion, no swelling, no ecchymosis, no deformity,  no laceration and normal pulse. Tenderness (across the left navicular bone - see attached picture). No lateral malleolus, no medial malleolus, no AITFL, no CF ligament, no posterior TFL, no head of 5th metatarsal and no proximal fibula tenderness found. Achilles tendon normal.        Feet:    Neurological: He is alert and oriented to person, place, and time. He exhibits normal muscle tone. Coordination normal.   Skin: Skin is warm, dry and intact. He is not diaphoretic. No pallor.   Psychiatric: He has a normal mood and affect. His speech is normal and behavior is normal. Judgment and thought content normal. Cognition and memory are normal.   Nursing note and vitals reviewed.          X-ray Ankle Complete 3 View Left    Result Date: 12/11/2018  EXAMINATION: XR ANKLE COMPLETE 3 VIEW LEFT CLINICAL HISTORY: Unspecified injury of left ankle, initial encounter TECHNIQUE: AP, lateral and oblique views of the left ankle were performed. COMPARISON: None FINDINGS: Three views. No acute displaced fracture or dislocation of the ankle.  No radiopaque foreign body.  The ankle mortise is intact.  No significant edema. There is cortical irregularity involving the dorsal aspect of the navicular.     1. No acute displaced fracture or dislocation of the ankle. 2. Cortical irregularity involving the dorsal aspect of the navicular, concerning for fracture, dedicated foot radiography advised. Electronically signed by: Christian Reilly MD Date:    12/11/2018 Time:    18:03    Xr Foot Complete 3 View Left    Result Date: 12/11/2018  EXAMINATION: XR FOOT COMPLETE 3 VIEW LEFT CLINICAL HISTORY: .  Unspecified injury of left ankle, initial encounter TECHNIQUE: AP, lateral and oblique views of the left foot were performed. COMPARISON: None FINDINGS: No displaced fracture or dislocation.  On the single view, lateral view, there is a lucency through the navicular which may be that of nondisplaced fracture.  As it cannot be confirmed on additional  views, artifact not excluded.  CT could further evaluate.  No significant adjacent soft tissue swelling.  Lisfranc articulation is congruent.  Cartilage spaces are maintained.  Soft tissues are unremarkable.     As above.  Nondisplaced fracture at the level of the navicular not excluded.  CT could further evaluate. Electronically signed by: Juan Mcbride MD Date:    12/11/2018 Time:    18:45    Assessment:       1. Closed nondisplaced fracture of navicular bone of left foot, initial encounter    2. Injury of left ankle, initial encounter    3. Pain, joint, ankle and foot, left        Plan:         Closed nondisplaced fracture of navicular bone of left foot, initial encounter    Injury of left ankle, initial encounter  -     X-Ray Ankle Complete 3 View Left; Future; Expected date: 12/11/2018  -     XR FOOT COMPLETE 3 VIEW LEFT; Future; Expected date: 12/11/2018  -     AIR CAST WALKER BOOT FOR HOME USE  -     Ambulatory referral to Orthopedics    Pain, joint, ankle and foot, left  -     Discontinue: naproxen (NAPROSYN) 500 MG tablet; Take 1 tablet (500 mg total) by mouth 2 (two) times daily with meals. for 5 days  Dispense: 10 tablet; Refill: 0      Patient Instructions                                   Orthopedic Fracture Discharge Instructions  If your condition worsens or fails to improve we recommend that you receive another evaluation at the ER immediately or contact your PCP to discuss your concerns or return here. You must understand that you've received an urgent care treatment only and that you may be released before all your medical problems are known or treated. You the patient will arrange for [facility] as instructed.   Follow up with your Orthopedist within the next 48-72hrs for more evaluation and management of your condition.    If you were prescribed a narcotic or muscle relaxant do not drive or operate heavy machinery while taking these medications   Tylenol can also be used as directed for pain  unless you have an allergy to them or medical condition such as stomach ulcers, kidney or liver disease or blood thinners etc for which you should not be taking these type of medications.   Avoid NSAIDs like Ibuprofen, Aleve, Advil, Motrin, etc.; as they can cause a delay in healing of the bone.    RICE which means rest, ice compression and elevation are helpful.   If you were given a splint wear it at all times.   If you were given crutches use them as we instructed. Do not rest your armpits on the foam pad or you risk compressing the nerves and the vessels there   You may take over the counter Calcium 1000 mg daily and Vitamin D3 600 IU daily for 4-6 weeks to help with the bone healing process. If you are not allergic or do not have any contraindications.       General Referral to [facility]  You were referred to [facility] to Establish Care and Management of your condition.  Please call [phone] to schedule your appointment.    Please return here or go to the Emergency Department for any concerns or worsening of condition.  Please follow up with your primary care doctor or specialist in the next 48-72hrs as needed.    If you  smoke, please stop smoking.  R.I.C.E.    R.I.C.E. stands for Rest, Ice, Compression, and Elevation. Doing these things helps limit pain and swelling after an injury. R.I.C.E. also helps injuries heal faster. Use R.I.C.E. for sprains, strains, and severe bruises or bumps. Follow the tips on this handout and begin R.I.C.E. as soon as possible after an injury.  ? Rest  Pain is your bodys way of telling you to rest an injured area. Whether you have hurt an elbow, hand, foot, or knee, limiting its use will prevent further injury and help you heal.  ? Ice  Applying ice right after an injury helps prevent swelling and reduce pain. Dont place ice directly on your skin.  · Wrap a cold pack or bag of ice in a thin cloth. Place it over the injured area.  · Ice for 10 minutes  every 3 hours. Dont ice for more than 20 minutes at a time.  ? Compression  Putting pressure (compression) on an injury helps prevent swelling and provides support.  · Wrap the injured area firmly with an elastic bandage. If your hand or foot tingles, becomes discolored, or feels cold to the touch, the bandage may be too tight. Rewrap it more loosely.  · If your bandage becomes too loose, rewrap it.  · Do not wear an elastic bandage overnight.  ? Elevation  Keeping an injury elevated helps reduce swelling, pain, and throbbing. Elevation is most effective when the injury is kept elevated higher than the heart.     Call your healthcare provider if you notice any of the following:  · Fingers or toes feel numb, are cold to the touch, or change color  · Skin looks shiny or tight  · Pain, swelling, or bruising worsens and is not improved with elevation   Date Last Reviewed: 9/3/2015  © 9018-4044 [employer]. [address] [place] [address], [place] [address]. All rights reserved. This information is not intended as a substitute for professional medical care. Always follow your healthcare professional's instructions.

## 2018-12-14 ENCOUNTER — TELEPHONE (OUTPATIENT)
Dept: URGENT CARE | Facility: CLINIC | Age: 38
End: 2018-12-14

## 2018-12-17 ENCOUNTER — HOSPITAL ENCOUNTER (OUTPATIENT)
Dept: RADIOLOGY | Facility: HOSPITAL | Age: 38
Discharge: HOME | End: 2018-12-17
Attending: ORTHOPAEDIC SURGERY
Payer: COMMERCIAL

## 2018-12-17 DIAGNOSIS — M25.572 LEFT ANKLE PAIN, UNSPECIFIED CHRONICITY: ICD-10-CM

## 2018-12-17 PROCEDURE — 73700 CT LOWER EXTREMITY W/O DYE: CPT | Mod: TC,LT

## 2018-12-17 PROCEDURE — 73700 CT LOWER EXTREMITY W/O DYE: CPT | Mod: 26,LT,, | Performed by: RADIOLOGY

## 2018-12-17 PROCEDURE — 73721 MRI JNT OF LWR EXTRE W/O DYE: CPT | Mod: TC,LT

## 2018-12-17 PROCEDURE — 73721 MRI JNT OF LWR EXTRE W/O DYE: CPT | Mod: 26,LT,, | Performed by: RADIOLOGY

## 2018-12-18 NOTE — PROGRESS NOTES
Subjective:          Chief Complaint: Jason Madden is a 38 y.o. male who had concerns including Pain of the Left Ankle.    37yo M presents for MRI results of L ankle.     Interval history  Last week he was riding in a golf cart w/ his leg out when he hit the dorsum of his foot and forcefully plantar flexed it and may have externally rotated it. He developed swelling and has had pain in the anterior ankle since then. He was seen in urgent care and had XR performed and was given a walking boot. He has been WBAT in a walking boot and notes that the pain is much better when using the walking boot. Denies N/T into the foot. Did not have any plantar ecchymosis.            Review of Systems   Constitution: Negative for fever and night sweats.   HENT: Negative for hearing loss.    Eyes: Negative for blurred vision and visual disturbance.   Cardiovascular: Negative for chest pain and leg swelling.   Respiratory: Negative for shortness of breath.    Endocrine: Negative for polyuria.   Hematologic/Lymphatic: Negative for bleeding problem.   Skin: Negative for rash.   Musculoskeletal: Negative for back pain, joint pain, joint swelling, muscle cramps and muscle weakness.   Gastrointestinal: Negative for melena.   Genitourinary: Negative for hematuria.   Neurological: Negative for loss of balance, numbness and paresthesias.   Psychiatric/Behavioral: Negative for altered mental status.       Pain Related Questions  Over the past 3 days, what was your average pain during activity? (I.e. running, jogging, walking, climbing stairs, getting dressed, ect.): 2  Over the past 3 days, what was your highest pain level?: 2  Over the past 3 days, what was your lowest pain level? : 2    Other  How many nights a week are you awakened by your affected body part?: 0      Objective:        General: Jason is well-developed, well-nourished, appears stated age, in no acute distress, alert and oriented to time, place and person.      General    Vitals reviewed.  Constitutional: He is oriented to person, place, and time. He appears well-developed and well-nourished. No distress.   HENT:   Mouth/Throat: No oropharyngeal exudate.   Eyes: Right eye exhibits no discharge. Left eye exhibits no discharge.   Neck: Normal range of motion.   Cardiovascular: Normal rate.    Pulmonary/Chest: Breath sounds normal. No respiratory distress.   Neurological: He is alert and oriented to person, place, and time. He has normal reflexes. No cranial nerve deficit. Coordination normal.   Psychiatric: He has a normal mood and affect. His behavior is normal. Judgment and thought content normal.     General Musculoskeletal Exam   Gait: normal     Right Ankle/Foot Exam     Inspection   Scars: absent  Deformity: absent  Erythema: absent  Bruising: Ankle - absent Foot - absent  Effusion: Ankle - absent Foot - absent  Atrophy: Ankle - absent Foot - absent    Range of Motion   Ankle Joint   Dorsiflexion:  10 normal   Plantar flexion:  35 normal   Subtalar Joint   Inversion:  15 normal   Eversion:  5 normal   Tony Test:  negative  First MTP Joint: normal    Alignment   Knee Alignment: neutral  Hindfoot Alignment: neutral  Midfoot Alignment: normal  Forefoot Alignment: normal    Tests   Anterior drawer: 1+  Varus tilt: 1+  Heel Walk: able to perform  Tiptoe Walk: able to perform  Single Heel Rise: able to perform  External Rotation Test: negative  Squeeze Test: negative    Other   Ankle Crepitus: absent  Foot Crepitus:  absent  Sensation: normal  Peroneal Subluxation: negative    Left Ankle/Foot Exam     Inspection  Deformity: absent  Erythema: absent  Bruising: Ankle - absent Foot - absent  Effusion: Ankle - absent Foot - absent  Atrophy: Ankle - absent Foot - absent  Scars: absent    Swelling   The patient is swollen on the medial talar dome.    Tenderness   The patient is tender to palpation of the ATF and medial malleolus.    Range of Motion   Ankle  Joint  Dorsiflexion:  10 normal   Plantar flexion:  35 normal     Subtalar Joint   Inversion:  15 normal   Eversion:  5 normal   Tony Test:  normal  First MTP Joint: normal    Alignment   Knee Alignment: neutral  Hindfoot Alignment: neutral  Midfoot Alignment: normal  Forefoot Alignment: normal    Tests   Anterior drawer: 1+  Varus tilt: 1+  Heel Walk: able to perform  Tiptoe Walk: able to perform  Single Heel Rise: able to perform  External Rotation Test: negative  Squeeze Test: absent    Other   Foot Crepitus:  absent  Ankle Crepitus: absent  Sensation: normal  Peroneal Subluxation: negative      Muscle Strength   Right Lower Extremity   Anterior tibial:  5/5/5  Posterior tibial:  5/5/5  Gastrocsoleus:  5/5/5  Peroneal muscle:  5/5/5  EHL:  5/5  FDL: 5/5  EDL: 5/5  FHL: 5/5  Left Lower Extremity   Anterior tibial:  5/5/5   Posterior tibial:  5/5/5  Gastrocsoleus:  5/5/5  Peroneal muscle:  5/5/5  EHL:  5/5  FDL: 5/5  EDL: 5/5  FHL: 5/5    Reflexes     Left Side  Post Tibial:  2+  Achilles:  2+  Plantar Reflex: 2+    Right Side   Post Tibial:  2+  Achilles:  2+  Plantar Reflex: 2+    Vascular Exam     Right Pulses  Dorsalis Pedis:      2+  Posterior Tibial:      2+        Left Pulses  Dorsalis Pedis:      2+  Posterior Tibial:      2+        Edema  Right Lower Leg: absent  Left Lower Leg: absent    EXAMINATION:  XR FOOT COMPLETE 3 VIEW LEFT    CLINICAL HISTORY:  .  Unspecified injury of left ankle, initial encounter    TECHNIQUE:  AP, lateral and oblique views of the left foot were performed.    COMPARISON:  None    FINDINGS:  No displaced fracture or dislocation.  On the single view, lateral view, there is a lucency through the navicular which may be that of nondisplaced fracture.  As it cannot be confirmed on additional views, artifact not excluded.  CT could further evaluate.  No significant adjacent soft tissue swelling.  Lisfranc articulation is congruent.  Cartilage spaces are maintained.  Soft tissues are  unremarkable.      Impression       As above.  Nondisplaced fracture at the level of the navicular not excluded.  CT could further evaluate.         MRI left ankle without contrast 12/17/2018; left foot radiograph 12/11/2018; left ankle radiograph 12/11/2018    FINDINGS:  Bones: Nondisplaced linear defect extending through the posterosuperior corner of the navicular bone extending into the articular surface of talonavicular joint.  There is less than 1 mm step-off at the articular surface with increased cortical attenuation above the fracture line likely representing mild compaction.    Joints: No dislocation or subluxation.  There are few punctate osseous fragments in the superolateral calcaneocuboid joint space.  These findings are of uncertain significance.    Soft tissues: No significant effusion hematoma.  Remaining soft tissue structures appear within limits.      Impression       Left ankle: Nondisplaced fracture of the superior portion of navicula extending into the talonavicular joint.  No dislocation or subluxation.    Punctate osseous fragments in the calcaneocuboid joint space of uncertain significance.  No associated fractures seen.    Additional findings as above.     EXAMINATION:  MRI ANKLE WITHOUT CONTRAST LEFT    CLINICAL HISTORY:  Ankle pain, xray neg, uncertain etiol;  Pain in left ankle and joints of left foot    TECHNIQUE:  Multiplanar multisequence MRI examination of the  ankle.    COMPARISON:  12/11/2018 radiographs    FINDINGS:  TENDONS:  Posterior tibial, flexor digitorum longus, flexor hallucis longus, peroneus longus and brevis, and extensor tendons are intact.  Achilles tendon is intact.    LIGAMENTS:  Anterior talofibular, calcaneofibular, posterior talofibular ligaments are intact. Anterior and posterior tibiofibular (syndesmotic) ligaments are intact. Deltoid, spring, and Lis Franc ligaments are intact.    MUSCLES:  Normal bulk and signal.The tarsal tunnel muscles including abductor  hallucis longus, flexor digitorum brevis, and abductor digiti minimi are without denervation or atrophy.    BONES:  There is evidence for osseous marrow edema identified level of the anterior talus consistent with microtrabecular injury/contusion with a linear cortical line, corresponding with radiograph 12/11/2018, representative of nondisplaced fracture at the level of the navicular with evidence of intraosseous edema at that level.  Minimal edema also identified level of the anterior process of calcaneus and posterosuperior calcaneus as well as cuboid which may represent secondary sites of contusion.    CARTILAGE:  Tibiotalar, subtalar, and joint spaces of the midfoot are preserved.  Talar dome is normal.    MISC:  Plantar fascia and sinus tarsi are normal.      Impression       *Nondisplaced fracture at the level of the navicular corresponding with radiograph of 12/11/2018 with associated osseous contusion at the level of the talus, talonavicular joint level.  Suspect secondary contusions at the level of the anterior process of the calcaneus and cuboid.  *Normal tibiotalar joint and talar dome articular cartilage,  *Intact Achilles' tendon and plantar fascia.  * Normal medial and lateral ligaments.  *Intact tibialis posterior and peroneus brevis.             Assessment:       Encounter Diagnoses   Name Primary?    Closed nondisplaced fracture of navicular bone of left foot with routine healing, subsequent encounter Yes    Foot pain, left           Plan:       1) Ankle/foot function, SF-12 was not filled out today in clinic.     RTC in 4-6 weeks with Dr. Grullon for repeat evaluation. Message has been sent to Dr. Grullon's office and referral has been placed.    2) expect that this should heal well w/ protection in boot.     3) continue meloxicam PRN    4)  WBAT in CAM boot                [facility] patient questionnaires have been collected today.

## 2018-12-19 ENCOUNTER — OFFICE VISIT (OUTPATIENT)
Dept: SPORTS MEDICINE | Facility: CLINIC | Age: 38
End: 2018-12-19
Payer: COMMERCIAL

## 2018-12-19 VITALS
BODY MASS INDEX: 28.6 KG/M2 | DIASTOLIC BLOOD PRESSURE: 87 MMHG | SYSTOLIC BLOOD PRESSURE: 151 MMHG | HEART RATE: 73 BPM | HEIGHT: 75 IN | WEIGHT: 230 LBS

## 2018-12-19 DIAGNOSIS — S92.255D CLOSED NONDISPLACED FRACTURE OF NAVICULAR BONE OF LEFT FOOT WITH ROUTINE HEALING, SUBSEQUENT ENCOUNTER: Primary | ICD-10-CM

## 2018-12-19 DIAGNOSIS — S92.155D CLOSED NONDISPLACED AVULSION FRACTURE OF LEFT TALUS WITH ROUTINE HEALING, SUBSEQUENT ENCOUNTER: ICD-10-CM

## 2018-12-19 DIAGNOSIS — M79.672 FOOT PAIN, LEFT: ICD-10-CM

## 2018-12-19 PROCEDURE — 99213 OFFICE O/P EST LOW 20 MIN: CPT | Mod: S$GLB,,, | Performed by: ORTHOPAEDIC SURGERY

## 2018-12-19 PROCEDURE — 3008F BODY MASS INDEX DOCD: CPT | Mod: CPTII,S$GLB,, | Performed by: ORTHOPAEDIC SURGERY

## 2018-12-19 PROCEDURE — 99999 PR PBB SHADOW E&M-EST. PATIENT-LVL III: CPT | Mod: PBBFAC,,, | Performed by: ORTHOPAEDIC SURGERY

## 2019-01-17 ENCOUNTER — OFFICE VISIT (OUTPATIENT)
Dept: ORTHOPEDICS | Facility: CLINIC | Age: 39
End: 2019-01-17
Payer: COMMERCIAL

## 2019-01-17 ENCOUNTER — HOSPITAL ENCOUNTER (OUTPATIENT)
Dept: RADIOLOGY | Facility: HOSPITAL | Age: 39
Discharge: HOME | End: 2019-01-17
Attending: ORTHOPAEDIC SURGERY
Payer: COMMERCIAL

## 2019-01-17 VITALS
DIASTOLIC BLOOD PRESSURE: 84 MMHG | WEIGHT: 252.56 LBS | HEART RATE: 72 BPM | SYSTOLIC BLOOD PRESSURE: 136 MMHG | BODY MASS INDEX: 31.56 KG/M2

## 2019-01-17 DIAGNOSIS — S92.255D CLOSED NONDISPLACED FRACTURE OF NAVICULAR BONE OF LEFT FOOT WITH ROUTINE HEALING, SUBSEQUENT ENCOUNTER: ICD-10-CM

## 2019-01-17 DIAGNOSIS — S92.255D CLOSED NONDISPLACED FRACTURE OF NAVICULAR BONE OF LEFT FOOT WITH ROUTINE HEALING, SUBSEQUENT ENCOUNTER: Primary | ICD-10-CM

## 2019-01-17 PROCEDURE — 99999 PR PBB SHADOW E&M-EST. PATIENT-LVL III: CPT | Mod: PBBFAC,,, | Performed by: ORTHOPAEDIC SURGERY

## 2019-01-17 PROCEDURE — 99999 PR PBB SHADOW E&M-EST. PATIENT-LVL III: ICD-10-PCS | Mod: PBBFAC,,, | Performed by: ORTHOPAEDIC SURGERY

## 2019-01-17 PROCEDURE — 73630 X-RAY EXAM OF FOOT: CPT | Mod: 26,LT,, | Performed by: RADIOLOGY

## 2019-01-17 PROCEDURE — 73630 XR FOOT COMPLETE 3 VIEW LEFT: ICD-10-PCS | Mod: 26,LT,, | Performed by: RADIOLOGY

## 2019-01-17 PROCEDURE — 73630 X-RAY EXAM OF FOOT: CPT | Mod: TC,LT

## 2019-01-17 PROCEDURE — 3008F PR BODY MASS INDEX (BMI) DOCUMENTED: ICD-10-PCS | Mod: CPTII,S$GLB,, | Performed by: ORTHOPAEDIC SURGERY

## 2019-01-17 PROCEDURE — 3008F BODY MASS INDEX DOCD: CPT | Mod: CPTII,S$GLB,, | Performed by: ORTHOPAEDIC SURGERY

## 2019-01-17 PROCEDURE — 99213 OFFICE O/P EST LOW 20 MIN: CPT | Mod: S$GLB,,, | Performed by: ORTHOPAEDIC SURGERY

## 2019-01-17 PROCEDURE — 99213 PR OFFICE/OUTPT VISIT, EST, LEVL III, 20-29 MIN: ICD-10-PCS | Mod: S$GLB,,, | Performed by: ORTHOPAEDIC SURGERY

## 2019-01-17 NOTE — LETTER
January 17, 2019      Chris Kennedy MD  [address]  [facility] [address]           [facility]  [address]  [facility] [address]  Phone: [phone]          Patient: Jason Madden   MR Number: [id number]   YOB: 1980   Date of Visit: 1/17/2019       Dear Dr. Chris Kennedy:    Thank you for referring Jason Madden to me for evaluation. Attached you will find relevant portions of my assessment and plan of care.    If you have questions, please do not hesitate to call me. I look forward to following Jason Madden along with you.    Sincerely,    David Grullon MD    Enclosure  CC:  No Recipients    If you would like to receive this communication electronically, please contact [email or web address] or [phone] to request more information on [employer] access.    For providers and/or their staff who would like to refer a patient to [facility], please contact us through our one-stop-shop provider referral line, [facility], at [phone].    If you feel you have received this communication in error or would no longer like to receive these types of communications, please e-mail [email or web address]

## 2019-01-17 NOTE — PROGRESS NOTES
DATE: 1/17/2019  PATIENT: Jason Madden    CHIEF COMPLAINT: Left Foot Fracture     HISTORY:  Jason Madden is a 38 y.o. male presents for evaluation of L ankle. Five weeks ago he was riding in a golf cart w/ his leg out when he hit the dorsum of his foot and forcefully plantar flexed it and may have externally rotated it. He developed swelling and has had pain in the anterior ankle since then. He was seen in urgent care and had XR performed and was given a walking boot.  He was seen in Dr[facility] and a CT and MRI was done which confirmed non displaced fx of the navicular. He has been WBAT in a walking boot and his pain has significantly improved to where he now has no pain.  Denies N/T into the foot.    PAST MEDICAL/SURGICAL HISTORY:  History reviewed. No pertinent past medical history.  History reviewed. No pertinent surgical history.    Current Medications:   Current Outpatient Medications:     (Magic mouthwash) 1:1:1 Benadryl 12.5mg/5ml liq, aluminum & magnesium hydroxide-simehticone (Maalox), lidocaine viscous 2%, Swish and spit 5 mLs every 4 (four) hours as needed (gargle and spit). for mouth sores, Disp: 360 mL, Rfl: 0    fluticasone (FLONASE) 50 mcg/actuation nasal spray, 1 spray (50 mcg total) by Each Nare route once daily., Disp: 1 Bottle, Rfl: 0    olopatadine (PATANOL) 0.1 % ophthalmic solution, Place 1 drop into the right eye 2 (two) times daily. for 7 days, Disp: 5 mL, Rfl: 0    Social History:   Social History     Socioeconomic History    Marital status: Single     Spouse name: Not on file    Number of children: Not on file    Years of education: Not on file    Highest education level: Not on file   Social Needs    Financial resource strain: Not on file    Food insecurity - worry: Not on file    Food insecurity - inability: Not on file    Transportation needs - medical: Not on file    Transportation needs - non-medical: Not on file   Occupational History     Employer: [employer]   Tobacco Use    Smoking status: Never Smoker    Smokeless tobacco: Never Used   Substance and Sexual Activity    Alcohol use: Yes    Drug use: No    Sexual activity: Not on file   Other Topics Concern    Not on file   Social History Narrative    Not on file       REVIEW OF SYSTEMS:  Constitution: Negative. Negative for chills, fever and night sweats.   Cardiovascular: Negative for chest pain and syncope.   Respiratory: Negative for cough and shortness of breath.   Gastrointestinal: See HPI. Negative for nausea/vomiting. Negative for abdominal pain.  Genitourinary: See HPI. Negative for discoloration or dysuria.  Skin: Negative for dry skin, itching and rash.   Hematologic/Lymphatic: Negative for bleeding problem. Does not bruise/bleed easily.   Musculoskeletal: Negative for falls and muscle weakness.   Neurological: See HPI. No seizures.   Endocrine: Negative for polydipsia, polyphagia and polyuria.   Allergic/Immunologic: Negative for hives and persistent infections.    PHYSICAL EXAMINATION:    /84   Pulse 72   Wt 114.5 kg (252 lb 8.6 oz)   BMI 31.56 kg/m²     General: The patient is a  38 y.o. male in no apparent distress, the patient is orientatied to person, place and time.   Psych: Normal mood and affect  HEENT:  NCAT, sclera nonicteric  Lungs:  Respirations are equal and unlabored.  CV:  2+ bilateral upper and lower extremity pulses.  Skin:  Intact throughout.  Musculoskeletal: No pain with the range of motion of the bilateral hips. No trochanteric tenderness to palpation. No pain with range of motion about the bilateral knees.     No swelling, no ttp at the navicular, PTT is intact and 5/5 strength without pain        IMAGING:     Radiographs of the left foot  were ordered and personally reviewed with the patient today.    ASSESSMENT/PLAN:    Jason was seen today for consult and injury.    Diagnoses and all orders for this visit:    Closed nondisplaced fracture of navicular bone of  left foot with routine healing, subsequent encounter  -     X-Ray Foot Complete Left; Future      No Follow-up on file.    Jason Madden is a 38 y.o. male who has a healing non displaced navicular fx.    -Discussed with the patient extensively about the different management options both conservative and surgical options.  At this point he has been walking in a boot full weight bearing for 5 weeks and the fracture is still non displaced and he has no pain.  He can continue this and wean out of the boot as tolerated.  Avoid running or high impact activities for another month.  We will see him in 6 weeks.        I have personally taken the history and examined this patient and agree with the residents note as stated above.

## 2019-11-21 ENCOUNTER — OFFICE VISIT (OUTPATIENT)
Dept: URGENT CARE | Facility: CLINIC | Age: 39
End: 2019-11-21
Payer: COMMERCIAL

## 2019-11-21 VITALS
SYSTOLIC BLOOD PRESSURE: 95 MMHG | HEART RATE: 65 BPM | HEIGHT: 75 IN | OXYGEN SATURATION: 98 % | WEIGHT: 235 LBS | DIASTOLIC BLOOD PRESSURE: 60 MMHG | BODY MASS INDEX: 29.22 KG/M2 | RESPIRATION RATE: 16 BRPM | TEMPERATURE: 98 F

## 2019-11-21 DIAGNOSIS — J32.9 BACTERIAL SINUSITIS: Primary | ICD-10-CM

## 2019-11-21 DIAGNOSIS — B96.89 BACTERIAL SINUSITIS: Primary | ICD-10-CM

## 2019-11-21 PROCEDURE — 3008F PR BODY MASS INDEX (BMI) DOCUMENTED: ICD-10-PCS | Mod: CPTII,S$GLB,, | Performed by: NURSE PRACTITIONER

## 2019-11-21 PROCEDURE — 99214 PR OFFICE/OUTPT VISIT, EST, LEVL IV, 30-39 MIN: ICD-10-PCS | Mod: S$GLB,,, | Performed by: NURSE PRACTITIONER

## 2019-11-21 PROCEDURE — 99214 OFFICE O/P EST MOD 30 MIN: CPT | Mod: S$GLB,,, | Performed by: NURSE PRACTITIONER

## 2019-11-21 PROCEDURE — 3008F BODY MASS INDEX DOCD: CPT | Mod: CPTII,S$GLB,, | Performed by: NURSE PRACTITIONER

## 2019-11-21 RX ORDER — DOXYCYCLINE 100 MG/1
100 CAPSULE ORAL 2 TIMES DAILY
Qty: 14 CAPSULE | Refills: 0 | Status: SHIPPED | OUTPATIENT
Start: 2019-11-21 | End: 2019-11-28

## 2019-11-21 RX ORDER — IPRATROPIUM BROMIDE 21 UG/1
2 SPRAY, METERED NASAL 2 TIMES DAILY
Qty: 30 ML | Refills: 0 | Status: SHIPPED | OUTPATIENT
Start: 2019-11-21 | End: 2019-11-28

## 2019-11-21 NOTE — PROGRESS NOTES
"Subjective:       Patient ID: Jason Madden is a 39 y.o. male.    Vitals:  height is 6' 3" (1.905 m) and weight is 106.6 kg (235 lb). His oral temperature is 98.4 °F (36.9 °C). His blood pressure is 95/60 and his pulse is 65. His respiration is 16 and oxygen saturation is 98%.      Chief Complaint: URI    Patient complains of sinus congestion, sinus pressure, sore throat, and headaches.  He has tried OTC Allegra-D, Dayquil, & Sudafed for his symptoms, and states mild relief.    Provider note begins below:    No fever, but symptoms of sinus congestion, PND, HA and fatigue have been consistent for  9 days.  Numerous sick contacts at work. No N/V/D.    URI    This is a new problem. The current episode started in the past 7 days. The problem has been gradually worsening. There has been no fever. Associated symptoms include congestion, coughing (mild), headaches, a plugged ear sensation, rhinorrhea (mild), sinus pain, sneezing and a sore throat. Pertinent negatives include no abdominal pain, chest pain, diarrhea, dysuria, ear pain, joint pain, joint swelling, nausea, neck pain, rash, swollen glands, vomiting or wheezing. He has tried antihistamine, decongestant and acetaminophen for the symptoms. The treatment provided mild relief.       Constitution: Positive for fatigue. Negative for chills, sweating and fever.   HENT: Positive for congestion, postnasal drip, sinus pain, sinus pressure, sore throat and voice change. Negative for ear pain.    Neck: Negative for neck pain and painful lymph nodes.   Cardiovascular: Negative for chest pain.   Eyes: Negative for eye redness.   Respiratory: Positive for cough (mild). Negative for chest tightness, sputum production, bloody sputum, COPD, shortness of breath, stridor, wheezing and asthma.    Gastrointestinal: Negative for abdominal pain, nausea, vomiting and diarrhea.   Genitourinary: Negative for dysuria.   Musculoskeletal: Negative for muscle ache.   Skin: Negative for rash. "   Allergic/Immunologic: Positive for sneezing. Negative for seasonal allergies and asthma.   Neurological: Positive for headaches.   Hematologic/Lymphatic: Negative for swollen lymph nodes.       Objective:      Physical Exam   Constitutional: He is oriented to person, place, and time. He appears well-developed and well-nourished. He is cooperative.  Non-toxic appearance. He does not have a sickly appearance. He does not appear ill. No distress.   HENT:   Head: Normocephalic and atraumatic.   Right Ear: Hearing, tympanic membrane, external ear and ear canal normal.   Left Ear: Hearing, external ear and ear canal normal. Tympanic membrane is injected.   Nose: Mucosal edema and rhinorrhea present. No nasal deformity. No epistaxis. Right sinus exhibits no maxillary sinus tenderness and no frontal sinus tenderness. Left sinus exhibits no maxillary sinus tenderness and no frontal sinus tenderness.   Mouth/Throat: Uvula is midline and mucous membranes are normal. No trismus in the jaw. Normal dentition. No uvula swelling. Posterior oropharyngeal erythema and cobblestoning present. No oropharyngeal exudate or posterior oropharyngeal edema.   Eyes: Conjunctivae and lids are normal. No scleral icterus.   Neck: Trachea normal, full passive range of motion without pain and phonation normal. Neck supple. No neck rigidity. No edema and no erythema present.   Cardiovascular: Normal rate, regular rhythm, normal heart sounds, intact distal pulses and normal pulses.   Pulmonary/Chest: Effort normal and breath sounds normal. No stridor. No respiratory distress. He has no decreased breath sounds. He has no wheezes. He has no rhonchi.   Abdominal: Normal appearance.   Musculoskeletal: Normal range of motion. He exhibits no edema or deformity.   Neurological: He is alert and oriented to person, place, and time. He exhibits normal muscle tone. Coordination normal.   Skin: Skin is warm, dry, intact, not diaphoretic and not pale.    Psychiatric: He has a normal mood and affect. His speech is normal and behavior is normal. Judgment and thought content normal. Cognition and memory are normal.   Nursing note and vitals reviewed.        Assessment:       1. Bacterial sinusitis        Plan:         Bacterial sinusitis  -     ipratropium (ATROVENT) 0.03 % nasal spray; 2 sprays by Nasal route 2 (two) times daily. for 7 days  Dispense: 30 mL; Refill: 0  -     doxycycline (VIBRAMYCIN) 100 MG Cap; Take 1 capsule (100 mg total) by mouth 2 (two) times daily. for 7 days  Dispense: 14 capsule; Refill: 0      Patient Instructions     You may continue taking Tylenol (acetaminophen) or ibuprofen for pain and fever.      Acute Sinusitis     Acute sinusitis is irritation and swelling of the sinuses. It is usually caused by a viral infection after a common cold. Your doctor can help you find relief.  What is acute sinusitis?  Sinuses are air-filled spaces in the skull behind the face. They are kept moist and clean by a lining of mucosa. Things such as pollen, smoke, and chemical fumes can irritate the mucosa. It can then swell up. As a response to irritation, the mucosa makes more mucus and other fluids. Tiny hairlike cilia cover the mucosa. Cilia help carry mucus toward the opening of the sinus. Too much mucus may cause the cilia to stop working. This blocks the sinus opening. A buildup of fluid in the sinuses then causes pain and pressure. It can also encourage bacteria to grow in the sinuses.  Common symptoms of acute sinusitis  You may have:  · Facial soreness pain  · Headache  · Fever  · Fluid draining in the back of the throat (postnasal drip)  · Congestion  · Drainage that is thick and colored, instead of clear  · Cough  Diagnosing acute sinusitis  Your doctor will ask about your symptoms and health history. He or she will look at your ear, nose, and throat. You usually won't need to have X-rays taken.    The doctor may take a sample of mucus to check for  bacteria. If you have sinusitis that keeps coming back, you may need imaging tests such as X-rays or CAT scans. This will help your doctor check for a structural problem that may be causing the infection.  Treating acute sinusitis  Treatment is aimed at unblocking the sinus opening and helping the cilia work again. You may need to take antihistamine and decongestant medicine. These can reduce inflammation and decrease the amount of fluid your sinuses make. If you have a bacterial infection, you will need to take antibiotic medicine for 10 to 14 days. Take this medicine until it is gone, even if you feel better.  Date Last Reviewed: 10/1/2016  © 4310-0824 [employer]. [address] [place] [address], [place] [address]. All rights reserved. This information is not intended as a substitute for professional medical care. Always follow your healthcare professional's instructions.

## 2019-11-22 NOTE — PATIENT INSTRUCTIONS
You may continue taking Tylenol (acetaminophen) or ibuprofen for pain and fever.      Acute Sinusitis     Acute sinusitis is irritation and swelling of the sinuses. It is usually caused by a viral infection after a common cold. Your doctor can help you find relief.  What is acute sinusitis?  Sinuses are air-filled spaces in the skull behind the face. They are kept moist and clean by a lining of mucosa. Things such as pollen, smoke, and chemical fumes can irritate the mucosa. It can then swell up. As a response to irritation, the mucosa makes more mucus and other fluids. Tiny hairlike cilia cover the mucosa. Cilia help carry mucus toward the opening of the sinus. Too much mucus may cause the cilia to stop working. This blocks the sinus opening. A buildup of fluid in the sinuses then causes pain and pressure. It can also encourage bacteria to grow in the sinuses.  Common symptoms of acute sinusitis  You may have:  · Facial soreness pain  · Headache  · Fever  · Fluid draining in the back of the throat (postnasal drip)  · Congestion  · Drainage that is thick and colored, instead of clear  · Cough  Diagnosing acute sinusitis  Your doctor will ask about your symptoms and health history. He or she will look at your ear, nose, and throat. You usually won't need to have X-rays taken.    The doctor may take a sample of mucus to check for bacteria. If you have sinusitis that keeps coming back, you may need imaging tests such as X-rays or CAT scans. This will help your doctor check for a structural problem that may be causing the infection.  Treating acute sinusitis  Treatment is aimed at unblocking the sinus opening and helping the cilia work again. You may need to take antihistamine and decongestant medicine. These can reduce inflammation and decrease the amount of fluid your sinuses make. If you have a bacterial infection, you will need to take antibiotic medicine for 10 to 14 days. Take this medicine until it is gone,  even if you feel better.  Date Last Reviewed: 10/1/2016  © 3011-6768 [employer]. [address] [place] [address], [place] [address]. All rights reserved. This information is not intended as a substitute for professional medical care. Always follow your healthcare professional's instructions.

## 2019-11-24 ENCOUNTER — TELEPHONE (OUTPATIENT)
Dept: URGENT CARE | Facility: CLINIC | Age: 39
End: 2019-11-24

## 2021-04-13 ENCOUNTER — IMMUNIZATION (OUTPATIENT)
Dept: PRIMARY CARE CLINIC | Facility: CLINIC | Age: 41
End: 2021-04-13

## 2021-04-13 DIAGNOSIS — Z23 NEED FOR VACCINATION: Primary | ICD-10-CM

## 2021-04-13 PROCEDURE — 0001A COVID-19, MRNA, LNP-S, PF, 30 MCG/0.3 ML DOSE VACCINE: CPT | Mod: CV19,S$GLB,, | Performed by: INTERNAL MEDICINE

## 2021-04-13 PROCEDURE — 91300 COVID-19, MRNA, LNP-S, PF, 30 MCG/0.3 ML DOSE VACCINE: CPT | Mod: S$GLB,,, | Performed by: INTERNAL MEDICINE

## 2021-04-13 PROCEDURE — 0001A COVID-19, MRNA, LNP-S, PF, 30 MCG/0.3 ML DOSE VACCINE: ICD-10-PCS | Mod: CV19,S$GLB,, | Performed by: INTERNAL MEDICINE

## 2021-04-13 PROCEDURE — 91300 COVID-19, MRNA, LNP-S, PF, 30 MCG/0.3 ML DOSE VACCINE: ICD-10-PCS | Mod: S$GLB,,, | Performed by: INTERNAL MEDICINE

## 2021-05-04 ENCOUNTER — IMMUNIZATION (OUTPATIENT)
Dept: PRIMARY CARE CLINIC | Facility: CLINIC | Age: 41
End: 2021-05-04
Payer: COMMERCIAL

## 2021-05-04 DIAGNOSIS — Z23 NEED FOR VACCINATION: Primary | ICD-10-CM

## 2021-05-04 PROCEDURE — 0002A COVID-19, MRNA, LNP-S, PF, 30 MCG/0.3 ML DOSE VACCINE: CPT | Mod: CV19,S$GLB,, | Performed by: INTERNAL MEDICINE

## 2021-05-04 PROCEDURE — 91300 COVID-19, MRNA, LNP-S, PF, 30 MCG/0.3 ML DOSE VACCINE: ICD-10-PCS | Mod: S$GLB,,, | Performed by: INTERNAL MEDICINE

## 2021-05-04 PROCEDURE — 91300 COVID-19, MRNA, LNP-S, PF, 30 MCG/0.3 ML DOSE VACCINE: CPT | Mod: S$GLB,,, | Performed by: INTERNAL MEDICINE

## 2021-05-04 PROCEDURE — 0002A COVID-19, MRNA, LNP-S, PF, 30 MCG/0.3 ML DOSE VACCINE: ICD-10-PCS | Mod: CV19,S$GLB,, | Performed by: INTERNAL MEDICINE
